# Patient Record
Sex: MALE | Race: WHITE | Employment: FULL TIME | URBAN - NONMETROPOLITAN AREA
[De-identification: names, ages, dates, MRNs, and addresses within clinical notes are randomized per-mention and may not be internally consistent; named-entity substitution may affect disease eponyms.]

---

## 2020-10-12 ENCOUNTER — TELEPHONE (OUTPATIENT)
Dept: FAMILY MEDICINE CLINIC | Age: 45
End: 2020-10-12

## 2020-10-12 NOTE — TELEPHONE ENCOUNTER
Patient calling in to see if he can reestablish with you. He was last seen over three years ago. Please advise.

## 2020-11-02 ENCOUNTER — HOSPITAL ENCOUNTER (OUTPATIENT)
Age: 45
Setting detail: OBSERVATION
Discharge: HOME | End: 2020-11-04
Attending: EMERGENCY MEDICINE | Admitting: INTERNAL MEDICINE
Payer: COMMERCIAL

## 2020-11-02 ENCOUNTER — OFFICE VISIT (OUTPATIENT)
Dept: FAMILY MEDICINE CLINIC | Age: 45
End: 2020-11-02
Payer: COMMERCIAL

## 2020-11-02 VITALS
BODY MASS INDEX: 22.07 KG/M2 | HEART RATE: 104 BPM | SYSTOLIC BLOOD PRESSURE: 110 MMHG | HEIGHT: 69 IN | OXYGEN SATURATION: 96 % | TEMPERATURE: 97.9 F | DIASTOLIC BLOOD PRESSURE: 72 MMHG | WEIGHT: 149 LBS

## 2020-11-02 DIAGNOSIS — L40.50 PSORIATIC ARTHRITIS (HCC): ICD-10-CM

## 2020-11-02 DIAGNOSIS — R35.89 POLYURIA: ICD-10-CM

## 2020-11-02 DIAGNOSIS — Z00.00 PREVENTATIVE HEALTH CARE: ICD-10-CM

## 2020-11-02 DIAGNOSIS — R63.4 WEIGHT LOSS: ICD-10-CM

## 2020-11-02 DIAGNOSIS — R73.9 HYPERGLYCEMIA: ICD-10-CM

## 2020-11-02 PROBLEM — K76.0 FATTY LIVER: Status: ACTIVE | Noted: 2020-11-02

## 2020-11-02 PROBLEM — E11.00 TYPE 2 DIABETES MELLITUS WITH HYPEROSMOLARITY, WITHOUT LONG-TERM CURRENT USE OF INSULIN (HCC): Status: ACTIVE | Noted: 2020-11-02

## 2020-11-02 PROBLEM — E78.1 HYPERTRIGLYCERIDEMIA: Status: ACTIVE | Noted: 2020-11-02

## 2020-11-02 LAB
ALBUMIN SERPL-MCNC: 4.1 G/DL (ref 3.5–5.2)
ALBUMIN SERPL-MCNC: 4.3 G/DL (ref 3.5–5.2)
ALP BLD-CCNC: 128 U/L (ref 40–129)
ALP BLD-CCNC: 133 U/L (ref 40–129)
ALT SERPL-CCNC: 65 U/L (ref 0–40)
ALT SERPL-CCNC: 67 U/L (ref 0–40)
ANION GAP SERPL CALCULATED.3IONS-SCNC: 16 MMOL/L (ref 7–16)
ANION GAP SERPL CALCULATED.3IONS-SCNC: 23 MMOL/L (ref 7–16)
AST SERPL-CCNC: 189 U/L (ref 0–39)
AST SERPL-CCNC: 60 U/L (ref 0–39)
BACTERIA: ABNORMAL /HPF
BASOPHILS ABSOLUTE: 0.07 E9/L (ref 0–0.2)
BASOPHILS ABSOLUTE: 0.07 E9/L (ref 0–0.2)
BASOPHILS RELATIVE PERCENT: 1 % (ref 0–2)
BASOPHILS RELATIVE PERCENT: 1 % (ref 0–2)
BETA-HYDROXYBUTYRATE: 1.84 MMOL/L (ref 0.02–0.27)
BILIRUB SERPL-MCNC: 0.5 MG/DL (ref 0–1.2)
BILIRUB SERPL-MCNC: 0.6 MG/DL (ref 0–1.2)
BILIRUBIN URINE: NEGATIVE
BLOOD, URINE: NEGATIVE
BUN BLDV-MCNC: 16 MG/DL (ref 6–20)
BUN BLDV-MCNC: 17 MG/DL (ref 6–20)
CALCIUM SERPL-MCNC: 9.7 MG/DL (ref 8.6–10.2)
CALCIUM SERPL-MCNC: 9.9 MG/DL (ref 8.6–10.2)
CHLORIDE BLD-SCNC: 86 MMOL/L (ref 98–107)
CHLORIDE BLD-SCNC: 91 MMOL/L (ref 98–107)
CHOLESTEROL, TOTAL: 390 MG/DL (ref 0–199)
CHP ED QC CHECK: NORMAL
CLARITY: CLEAR
CO2: 20 MMOL/L (ref 22–29)
CO2: 24 MMOL/L (ref 22–29)
COLOR: ABNORMAL
CREAT SERPL-MCNC: 0.6 MG/DL (ref 0.7–1.2)
CREAT SERPL-MCNC: 0.8 MG/DL (ref 0.7–1.2)
EOSINOPHILS ABSOLUTE: 0.08 E9/L (ref 0.05–0.5)
EOSINOPHILS ABSOLUTE: 0.15 E9/L (ref 0.05–0.5)
EOSINOPHILS RELATIVE PERCENT: 1.1 % (ref 0–6)
EOSINOPHILS RELATIVE PERCENT: 2.1 % (ref 0–6)
GFR AFRICAN AMERICAN: >60
GFR AFRICAN AMERICAN: >60
GFR NON-AFRICAN AMERICAN: >60 ML/MIN/1.73
GFR NON-AFRICAN AMERICAN: >60 ML/MIN/1.73
GLUCOSE BLD-MCNC: 485 MG/DL
GLUCOSE BLD-MCNC: 512 MG/DL (ref 74–99)
GLUCOSE BLD-MCNC: 727 MG/DL (ref 74–99)
GLUCOSE URINE: >=1000 MG/DL
HBA1C MFR BLD: 19 % (ref 4–5.6)
HCT VFR BLD CALC: 43.6 % (ref 37–54)
HCT VFR BLD CALC: 45 % (ref 37–54)
HDLC SERPL-MCNC: 51 MG/DL
HEMOGLOBIN: 16 G/DL (ref 12.5–16.5)
HEMOGLOBIN: 16.1 G/DL (ref 12.5–16.5)
IMMATURE GRANULOCYTES #: 0.07 E9/L
IMMATURE GRANULOCYTES #: 0.1 E9/L
IMMATURE GRANULOCYTES %: 1 % (ref 0–5)
IMMATURE GRANULOCYTES %: 1.4 % (ref 0–5)
KETONES, URINE: 15 MG/DL
LDL CHOLESTEROL CALCULATED: ABNORMAL MG/DL (ref 0–99)
LEUKOCYTE ESTERASE, URINE: NEGATIVE
LIPASE: 42 U/L (ref 13–60)
LYMPHOCYTES ABSOLUTE: 1.37 E9/L (ref 1.5–4)
LYMPHOCYTES ABSOLUTE: 1.69 E9/L (ref 1.5–4)
LYMPHOCYTES RELATIVE PERCENT: 18.7 % (ref 20–42)
LYMPHOCYTES RELATIVE PERCENT: 23.7 % (ref 20–42)
MCH RBC QN AUTO: 33 PG (ref 26–35)
MCH RBC QN AUTO: 33.5 PG (ref 26–35)
MCHC RBC AUTO-ENTMCNC: 35.8 % (ref 32–34.5)
MCHC RBC AUTO-ENTMCNC: 36.7 % (ref 32–34.5)
MCV RBC AUTO: 91.4 FL (ref 80–99.9)
MCV RBC AUTO: 92.2 FL (ref 80–99.9)
METER GLUCOSE: >500 MG/DL (ref 74–99)
MONOCYTES ABSOLUTE: 0.68 E9/L (ref 0.1–0.95)
MONOCYTES ABSOLUTE: 0.69 E9/L (ref 0.1–0.95)
MONOCYTES RELATIVE PERCENT: 9.3 % (ref 2–12)
MONOCYTES RELATIVE PERCENT: 9.7 % (ref 2–12)
NEUTROPHILS ABSOLUTE: 4.46 E9/L (ref 1.8–7.3)
NEUTROPHILS ABSOLUTE: 5.01 E9/L (ref 1.8–7.3)
NEUTROPHILS RELATIVE PERCENT: 62.5 % (ref 43–80)
NEUTROPHILS RELATIVE PERCENT: 68.5 % (ref 43–80)
NITRITE, URINE: NEGATIVE
PDW BLD-RTO: 12.2 FL (ref 11.5–15)
PDW BLD-RTO: 12.3 FL (ref 11.5–15)
PH UA: 7 (ref 5–9)
PH VENOUS: 7.36 (ref 7.35–7.45)
PLATELET # BLD: 248 E9/L (ref 130–450)
PLATELET # BLD: 263 E9/L (ref 130–450)
PMV BLD AUTO: 10.1 FL (ref 7–12)
PMV BLD AUTO: 10.6 FL (ref 7–12)
POTASSIUM REFLEX MAGNESIUM: 4.7 MMOL/L (ref 3.5–5)
POTASSIUM SERPL-SCNC: 4.7 MMOL/L (ref 3.5–5)
PROTEIN UA: NEGATIVE MG/DL
RBC # BLD: 4.77 E12/L (ref 3.8–5.8)
RBC # BLD: 4.88 E12/L (ref 3.8–5.8)
RBC UA: ABNORMAL /HPF (ref 0–2)
SODIUM BLD-SCNC: 126 MMOL/L (ref 132–146)
SODIUM BLD-SCNC: 134 MMOL/L (ref 132–146)
SPECIFIC GRAVITY UA: <=1.005 (ref 1–1.03)
TOTAL PROTEIN: 7.2 G/DL (ref 6.4–8.3)
TOTAL PROTEIN: 7.2 G/DL (ref 6.4–8.3)
TRIGL SERPL-MCNC: 1774 MG/DL (ref 0–149)
TRIGL SERPL-MCNC: 702 MG/DL (ref 0–149)
TSH SERPL DL<=0.05 MIU/L-ACNC: 1.31 UIU/ML (ref 0.27–4.2)
UROBILINOGEN, URINE: 0.2 E.U./DL
VLDLC SERPL CALC-MCNC: ABNORMAL MG/DL
WBC # BLD: 7.1 E9/L (ref 4.5–11.5)
WBC # BLD: 7.3 E9/L (ref 4.5–11.5)
WBC UA: ABNORMAL /HPF (ref 0–5)

## 2020-11-02 PROCEDURE — 81001 URINALYSIS AUTO W/SCOPE: CPT

## 2020-11-02 PROCEDURE — 99285 EMERGENCY DEPT VISIT HI MDM: CPT

## 2020-11-02 PROCEDURE — 96361 HYDRATE IV INFUSION ADD-ON: CPT

## 2020-11-02 PROCEDURE — 82800 BLOOD PH: CPT

## 2020-11-02 PROCEDURE — 93005 ELECTROCARDIOGRAM TRACING: CPT | Performed by: EMERGENCY MEDICINE

## 2020-11-02 PROCEDURE — 99220 PR INITIAL OBSERVATION CARE/DAY 70 MINUTES: CPT | Performed by: INTERNAL MEDICINE

## 2020-11-02 PROCEDURE — 85025 COMPLETE CBC W/AUTO DIFF WBC: CPT

## 2020-11-02 PROCEDURE — 2580000003 HC RX 258: Performed by: EMERGENCY MEDICINE

## 2020-11-02 PROCEDURE — 83690 ASSAY OF LIPASE: CPT

## 2020-11-02 PROCEDURE — 80053 COMPREHEN METABOLIC PANEL: CPT

## 2020-11-02 PROCEDURE — 82962 GLUCOSE BLOOD TEST: CPT | Performed by: FAMILY MEDICINE

## 2020-11-02 PROCEDURE — 6370000000 HC RX 637 (ALT 250 FOR IP): Performed by: EMERGENCY MEDICINE

## 2020-11-02 PROCEDURE — G0378 HOSPITAL OBSERVATION PER HR: HCPCS

## 2020-11-02 PROCEDURE — 99203 OFFICE O/P NEW LOW 30 MIN: CPT | Performed by: FAMILY MEDICINE

## 2020-11-02 PROCEDURE — 84478 ASSAY OF TRIGLYCERIDES: CPT

## 2020-11-02 PROCEDURE — 82010 KETONE BODYS QUAN: CPT

## 2020-11-02 PROCEDURE — 96360 HYDRATION IV INFUSION INIT: CPT

## 2020-11-02 RX ORDER — 0.9 % SODIUM CHLORIDE 0.9 %
1000 INTRAVENOUS SOLUTION INTRAVENOUS ONCE
Status: DISCONTINUED | OUTPATIENT
Start: 2020-11-02 | End: 2020-11-03

## 2020-11-02 RX ORDER — IXEKIZUMAB 80 MG/ML
80 INJECTION, SOLUTION SUBCUTANEOUS ONCE
COMMUNITY

## 2020-11-02 RX ORDER — 0.9 % SODIUM CHLORIDE 0.9 %
1000 INTRAVENOUS SOLUTION INTRAVENOUS ONCE
Status: COMPLETED | OUTPATIENT
Start: 2020-11-02 | End: 2020-11-03

## 2020-11-02 RX ADMIN — SODIUM CHLORIDE 1000 ML: 9 INJECTION, SOLUTION INTRAVENOUS at 21:46

## 2020-11-02 RX ADMIN — INSULIN HUMAN 5 UNITS: 100 INJECTION, SOLUTION PARENTERAL at 22:52

## 2020-11-02 RX ADMIN — SODIUM CHLORIDE 1000 ML: 9 INJECTION, SOLUTION INTRAVENOUS at 23:00

## 2020-11-02 ASSESSMENT — ENCOUNTER SYMPTOMS
EYE DISCHARGE: 0
SORE THROAT: 0
VOMITING: 0
SHORTNESS OF BREATH: 0
COUGH: 0
ABDOMINAL PAIN: 0
SHORTNESS OF BREATH: 0
WHEEZING: 0
WHEEZING: 0
NAUSEA: 0
EYE REDNESS: 0
EYE PAIN: 0
SINUS PRESSURE: 0
BACK PAIN: 0
NAUSEA: 0
DIARRHEA: 0
VOMITING: 0

## 2020-11-02 ASSESSMENT — PAIN SCALES - GENERAL: PAINLEVEL_OUTOF10: 0

## 2020-11-02 ASSESSMENT — PATIENT HEALTH QUESTIONNAIRE - PHQ9
SUM OF ALL RESPONSES TO PHQ QUESTIONS 1-9: 0
2. FEELING DOWN, DEPRESSED OR HOPELESS: 0
1. LITTLE INTEREST OR PLEASURE IN DOING THINGS: 0
SUM OF ALL RESPONSES TO PHQ9 QUESTIONS 1 & 2: 0
SUM OF ALL RESPONSES TO PHQ QUESTIONS 1-9: 0
SUM OF ALL RESPONSES TO PHQ QUESTIONS 1-9: 0

## 2020-11-02 NOTE — PROGRESS NOTES
[facility] presents to the office today for   Chief Complaint   Patient presents with   Aaron Holloway Doctor     Weight loss  Cristóbal weighed 215 lbs  Started fruit and water diet  Last 2-3 months drinking a lot of water and urinating frequently  He reports he has been at 150 lbs for past few months  +blurry vision  Family history of type 2 diabetes  He has not eaten since 10 pm last night and glucose today is 485  No nausea or vomiting  Feeling well  Toes numb at end of the day    Works as     Psoriatic arthritis  Taltz through Dr. Lili Ortega at [id number] [facility]    Declines flu vaccine    Review of Systems   Constitutional: Negative for chills and fever. Respiratory: Negative for shortness of breath and wheezing. Cardiovascular: Negative for chest pain and palpitations. Gastrointestinal: Negative for nausea and vomiting. Genitourinary: Negative for dysuria, hematuria and urgency. Skin: Negative for rash. Neurological: Negative for dizziness and light-headedness. /72   Pulse 104   Temp 97.9 °F (36.6 °C) (Temporal)   Ht 5' 9\" (1.753 m)   Wt 149 lb (67.6 kg)   SpO2 96%   BMI 22.00 kg/m²   Physical Exam  Constitutional:       Appearance: Normal appearance. HENT:      Head: Normocephalic and atraumatic. Nose: Nose normal.      Mouth/Throat:      Mouth: Mucous membranes are moist.      Pharynx: No posterior oropharyngeal erythema. Eyes:      Extraocular Movements: Extraocular movements intact. Conjunctiva/sclera: Conjunctivae normal.   Cardiovascular:      Rate and Rhythm: Normal rate. Heart sounds: Normal heart sounds. Pulmonary:      Effort: Pulmonary effort is normal.      Breath sounds: Normal breath sounds. Skin:     General: Skin is warm. Neurological:      Mental Status: He is alert and oriented to person, place, and time.    Psychiatric:         Mood and Affect: Mood normal.         Behavior: Behavior normal. Current Outpatient Medications:     ixekizumab (TALTZ) 80 MG/ML SOAJ prefilled syringe, Inject 80 mg into the skin once, Disp: , Rfl:      Past Medical History:   Diagnosis Date    Psoriasis        Tammie Jordan was seen today for established new doctor. Diagnoses and all orders for this visit:    Polyuria  -     POCT Glucose  -     Comprehensive Metabolic Panel; Future  -     CBC Auto Differential; Future  -     Hemoglobin A1C; Future  -     Lipid Panel; Future  -     TSH without Reflex; Future  -     GLUTAMIC ACID DECARBOXYLASE; Future    Weight loss  -     Comprehensive Metabolic Panel; Future  -     CBC Auto Differential; Future  -     Hemoglobin A1C; Future  -     Lipid Panel; Future  -     TSH without Reflex; Future  -     GLUTAMIC ACID DECARBOXYLASE; Future    Hyperglycemia  -     Comprehensive Metabolic Panel; Future  -     CBC Auto Differential; Future  -     Hemoglobin A1C; Future  -     Lipid Panel; Future  -     TSH without Reflex; Future  -     GLUTAMIC ACID DECARBOXYLASE; Future    Preventative health care  -     Comprehensive Metabolic Panel; Future  -     CBC Auto Differential; Future  -     Lipid Panel; Future  -     TSH without Reflex; Future    Psoriatic arthritis ([facility] [address]  -     Comprehensive Metabolic Panel;  Future  -     CBC Auto Differential; Future       Concern for DKA  Check labs today  If electrolytes ok, will see tomorrow AM to start insulin  Concern for late onset type 1 given he has psoriatic arthritis  May need MALINA Salazar MD

## 2020-11-03 PROBLEM — E11.9 NEW ONSET TYPE 2 DIABETES MELLITUS (HCC): Status: ACTIVE | Noted: 2020-11-02

## 2020-11-03 LAB
ALBUMIN SERPL-MCNC: 3.2 G/DL (ref 3.5–5.2)
ALP BLD-CCNC: 88 U/L (ref 40–129)
ALT SERPL-CCNC: 45 U/L (ref 0–40)
ANION GAP SERPL CALCULATED.3IONS-SCNC: 9 MMOL/L (ref 7–16)
AST SERPL-CCNC: 111 U/L (ref 0–39)
BASOPHILS ABSOLUTE: 0.06 E9/L (ref 0–0.2)
BASOPHILS RELATIVE PERCENT: 0.9 % (ref 0–2)
BILIRUB SERPL-MCNC: 0.3 MG/DL (ref 0–1.2)
BUN BLDV-MCNC: 12 MG/DL (ref 6–20)
CALCIUM SERPL-MCNC: 8.4 MG/DL (ref 8.6–10.2)
CHLORIDE BLD-SCNC: 101 MMOL/L (ref 98–107)
CO2: 24 MMOL/L (ref 22–29)
CREAT SERPL-MCNC: 0.5 MG/DL (ref 0.7–1.2)
EKG ATRIAL RATE: 99 BPM
EKG P AXIS: 51 DEGREES
EKG P-R INTERVAL: 146 MS
EKG Q-T INTERVAL: 352 MS
EKG QRS DURATION: 88 MS
EKG QTC CALCULATION (BAZETT): 451 MS
EKG R AXIS: 14 DEGREES
EKG T AXIS: 44 DEGREES
EKG VENTRICULAR RATE: 99 BPM
EOSINOPHILS ABSOLUTE: 0.16 E9/L (ref 0.05–0.5)
EOSINOPHILS RELATIVE PERCENT: 2.4 % (ref 0–6)
FERRITIN: 1940 NG/ML
GFR AFRICAN AMERICAN: >60
GFR NON-AFRICAN AMERICAN: >60 ML/MIN/1.73
GLUCOSE BLD-MCNC: 218 MG/DL (ref 74–99)
HCT VFR BLD CALC: 39.4 % (ref 37–54)
HEMOGLOBIN: 13.9 G/DL (ref 12.5–16.5)
IMMATURE GRANULOCYTES #: 0.09 E9/L
IMMATURE GRANULOCYTES %: 1.3 % (ref 0–5)
IRON SATURATION: 61 % (ref 20–55)
IRON: 137 MCG/DL (ref 59–158)
LYMPHOCYTES ABSOLUTE: 1.78 E9/L (ref 1.5–4)
LYMPHOCYTES RELATIVE PERCENT: 26.6 % (ref 20–42)
MAGNESIUM: 2 MG/DL (ref 1.6–2.6)
MCH RBC QN AUTO: 33.3 PG (ref 26–35)
MCHC RBC AUTO-ENTMCNC: 35.3 % (ref 32–34.5)
MCV RBC AUTO: 94.5 FL (ref 80–99.9)
METER GLUCOSE: 218 MG/DL (ref 74–99)
METER GLUCOSE: 238 MG/DL (ref 74–99)
METER GLUCOSE: 259 MG/DL (ref 74–99)
METER GLUCOSE: 260 MG/DL (ref 74–99)
METER GLUCOSE: 317 MG/DL (ref 74–99)
METER GLUCOSE: 362 MG/DL (ref 74–99)
METER GLUCOSE: 371 MG/DL (ref 74–99)
METER GLUCOSE: 402 MG/DL (ref 74–99)
MONOCYTES ABSOLUTE: 0.61 E9/L (ref 0.1–0.95)
MONOCYTES RELATIVE PERCENT: 9.1 % (ref 2–12)
NEUTROPHILS ABSOLUTE: 4 E9/L (ref 1.8–7.3)
NEUTROPHILS RELATIVE PERCENT: 59.7 % (ref 43–80)
PDW BLD-RTO: 12.5 FL (ref 11.5–15)
PHOSPHORUS: 3.1 MG/DL (ref 2.5–4.5)
PLATELET # BLD: 221 E9/L (ref 130–450)
PMV BLD AUTO: 9.8 FL (ref 7–12)
POTASSIUM SERPL-SCNC: 4 MMOL/L (ref 3.5–5)
RBC # BLD: 4.17 E12/L (ref 3.8–5.8)
SODIUM BLD-SCNC: 134 MMOL/L (ref 132–146)
T4 FREE: 0.77 NG/DL (ref 0.93–1.7)
TOTAL IRON BINDING CAPACITY: 225 MCG/DL (ref 250–450)
TOTAL PROTEIN: 6 G/DL (ref 6.4–8.3)
TRIGL SERPL-MCNC: 814 MG/DL (ref 0–149)
TSH SERPL DL<=0.05 MIU/L-ACNC: 1.09 UIU/ML (ref 0.27–4.2)
WBC # BLD: 6.7 E9/L (ref 4.5–11.5)

## 2020-11-03 PROCEDURE — 80053 COMPREHEN METABOLIC PANEL: CPT

## 2020-11-03 PROCEDURE — 83550 IRON BINDING TEST: CPT

## 2020-11-03 PROCEDURE — 99254 IP/OBS CNSLTJ NEW/EST MOD 60: CPT | Performed by: INTERNAL MEDICINE

## 2020-11-03 PROCEDURE — 82962 GLUCOSE BLOOD TEST: CPT

## 2020-11-03 PROCEDURE — 6370000000 HC RX 637 (ALT 250 FOR IP): Performed by: INTERNAL MEDICINE

## 2020-11-03 PROCEDURE — 82728 ASSAY OF FERRITIN: CPT

## 2020-11-03 PROCEDURE — 86803 HEPATITIS C AB TEST: CPT

## 2020-11-03 PROCEDURE — 85025 COMPLETE CBC W/AUTO DIFF WBC: CPT

## 2020-11-03 PROCEDURE — 36415 COLL VENOUS BLD VENIPUNCTURE: CPT

## 2020-11-03 PROCEDURE — 99225 PR SBSQ OBSERVATION CARE/DAY 25 MINUTES: CPT | Performed by: FAMILY MEDICINE

## 2020-11-03 PROCEDURE — APPSS30 APP SPLIT SHARED TIME 16-30 MINUTES: Performed by: CLINICAL NURSE SPECIALIST

## 2020-11-03 PROCEDURE — 84439 ASSAY OF FREE THYROXINE: CPT

## 2020-11-03 PROCEDURE — 84100 ASSAY OF PHOSPHORUS: CPT

## 2020-11-03 PROCEDURE — 83036 HEMOGLOBIN GLYCOSYLATED A1C: CPT

## 2020-11-03 PROCEDURE — 2580000003 HC RX 258: Performed by: INTERNAL MEDICINE

## 2020-11-03 PROCEDURE — G0378 HOSPITAL OBSERVATION PER HR: HCPCS

## 2020-11-03 PROCEDURE — 84443 ASSAY THYROID STIM HORMONE: CPT

## 2020-11-03 PROCEDURE — 83540 ASSAY OF IRON: CPT

## 2020-11-03 PROCEDURE — 83735 ASSAY OF MAGNESIUM: CPT

## 2020-11-03 PROCEDURE — 93010 ELECTROCARDIOGRAM REPORT: CPT | Performed by: INTERNAL MEDICINE

## 2020-11-03 PROCEDURE — 84478 ASSAY OF TRIGLYCERIDES: CPT

## 2020-11-03 RX ORDER — NICOTINE POLACRILEX 4 MG
15 LOZENGE BUCCAL PRN
Status: DISCONTINUED | OUTPATIENT
Start: 2020-11-03 | End: 2020-11-04 | Stop reason: HOSPADM

## 2020-11-03 RX ORDER — INSULIN GLARGINE 100 [IU]/ML
18 INJECTION, SOLUTION SUBCUTANEOUS NIGHTLY
Status: DISCONTINUED | OUTPATIENT
Start: 2020-11-03 | End: 2020-11-04 | Stop reason: HOSPADM

## 2020-11-03 RX ORDER — DEXTROSE MONOHYDRATE 25 G/50ML
12.5 INJECTION, SOLUTION INTRAVENOUS PRN
Status: DISCONTINUED | OUTPATIENT
Start: 2020-11-03 | End: 2020-11-04 | Stop reason: HOSPADM

## 2020-11-03 RX ORDER — DEXTROSE AND SODIUM CHLORIDE 5; .9 G/100ML; G/100ML
INJECTION, SOLUTION INTRAVENOUS CONTINUOUS
Status: DISCONTINUED | OUTPATIENT
Start: 2020-11-03 | End: 2020-11-04 | Stop reason: HOSPADM

## 2020-11-03 RX ORDER — DEXTROSE MONOHYDRATE 50 MG/ML
100 INJECTION, SOLUTION INTRAVENOUS PRN
Status: DISCONTINUED | OUTPATIENT
Start: 2020-11-03 | End: 2020-11-03 | Stop reason: SDUPTHER

## 2020-11-03 RX ORDER — ATORVASTATIN CALCIUM 20 MG/1
20 TABLET, FILM COATED ORAL NIGHTLY
Status: DISCONTINUED | OUTPATIENT
Start: 2020-11-04 | End: 2020-11-04 | Stop reason: HOSPADM

## 2020-11-03 RX ORDER — NICOTINE POLACRILEX 4 MG
15 LOZENGE BUCCAL PRN
Status: DISCONTINUED | OUTPATIENT
Start: 2020-11-03 | End: 2020-11-03 | Stop reason: SDUPTHER

## 2020-11-03 RX ORDER — DEXTROSE MONOHYDRATE 25 G/50ML
12.5 INJECTION, SOLUTION INTRAVENOUS PRN
Status: DISCONTINUED | OUTPATIENT
Start: 2020-11-03 | End: 2020-11-03 | Stop reason: SDUPTHER

## 2020-11-03 RX ORDER — POTASSIUM CHLORIDE 20 MEQ/1
40 TABLET, EXTENDED RELEASE ORAL ONCE
Status: COMPLETED | OUTPATIENT
Start: 2020-11-03 | End: 2020-11-03

## 2020-11-03 RX ORDER — DEXTROSE MONOHYDRATE 50 MG/ML
100 INJECTION, SOLUTION INTRAVENOUS PRN
Status: DISCONTINUED | OUTPATIENT
Start: 2020-11-03 | End: 2020-11-04 | Stop reason: HOSPADM

## 2020-11-03 RX ADMIN — DEXTROSE AND SODIUM CHLORIDE: 5; 900 INJECTION, SOLUTION INTRAVENOUS at 02:32

## 2020-11-03 RX ADMIN — INSULIN LISPRO 3 UNITS: 100 INJECTION, SOLUTION INTRAVENOUS; SUBCUTANEOUS at 04:26

## 2020-11-03 RX ADMIN — INSULIN LISPRO 10 UNITS: 100 INJECTION, SOLUTION INTRAVENOUS; SUBCUTANEOUS at 18:53

## 2020-11-03 RX ADMIN — METFORMIN HYDROCHLORIDE 500 MG: 500 TABLET ORAL at 08:20

## 2020-11-03 RX ADMIN — INSULIN LISPRO 2 UNITS: 100 INJECTION, SOLUTION INTRAVENOUS; SUBCUTANEOUS at 02:26

## 2020-11-03 RX ADMIN — METFORMIN HYDROCHLORIDE 500 MG: 500 TABLET ORAL at 16:20

## 2020-11-03 RX ADMIN — INSULIN LISPRO 12 UNITS: 100 INJECTION, SOLUTION INTRAVENOUS; SUBCUTANEOUS at 00:08

## 2020-11-03 RX ADMIN — POTASSIUM CHLORIDE 40 MEQ: 20 TABLET, EXTENDED RELEASE ORAL at 01:02

## 2020-11-03 RX ADMIN — INSULIN LISPRO 6 UNITS: 100 INJECTION, SOLUTION INTRAVENOUS; SUBCUTANEOUS at 18:52

## 2020-11-03 RX ADMIN — INSULIN LISPRO 2 UNITS: 100 INJECTION, SOLUTION INTRAVENOUS; SUBCUTANEOUS at 06:39

## 2020-11-03 RX ADMIN — SODIUM CHLORIDE 1000 ML: 9 INJECTION, SOLUTION INTRAVENOUS at 00:36

## 2020-11-03 ASSESSMENT — PAIN SCALES - GENERAL
PAINLEVEL_OUTOF10: 0

## 2020-11-03 NOTE — CONSULTS
ENDOCRINOLOGY INITIAL CONSULTATION NOTE      Date of admission: 11/2/2020  Date of service: 11/3/2020  Admitting physician: Rachel Song MD   Primary Care Physician: Courtney Grossman MD  Consultant physician: Katie Toledo MD     Reason for the consultation:  Newly diagnosed DM     History of Present Illness: The history is provided by the patient. Accuracy of the patient data is excellent    Baribe Molina is a very pleasant 50 y.o. old male with PMH DM type 2, HLD admitted to [address] [place] [address] on 11/2/2020 because of marked hyperglycemia and hyperTG , endocrine team was consulted for diabetes management. Pt denied any h/o DM in the past  Over the past few weeks he was c/o tiredness, fatigue polyuria and polydipsia     Admission labs: , AG 23, Bicarb 20 Cr 0.6, K 4.7, Beta-hydroxybutyrate 1.84,  TG 1774, lipase normal      exam.   Lab Results   Component Value Date    LABA1C 19.0 11/02/2020       After admission   While hospitalized, anti-diabetic regiment includes low dose ss. [facility] of care glucose monitoring was reviewed and has been above goal. Appetite is good. Inpatient diet:   Carb Restricted diet     Point of care glucose monitoring (Independently reviewed)   Recent Labs     11/02/20  1853 11/03/20  0004 11/03/20  0214 11/03/20  0424 11/03/20  0638 11/03/20  0820   GLUMET >500* 402* 218* 260* 238* 259*       Past medical history:   Past Medical History:   Diagnosis Date    Psoriasis        Past surgical history:  History reviewed. No pertinent surgical history. Social history:   Tobacco:   reports that he has never smoked. He has never used smokeless tobacco.  Alcohol:   reports current alcohol use. Drugs:   reports no history of drug use. Family history:    History reviewed. No pertinent family history.     Allergy and drug reactions:   No Known Allergies    Scheduled Meds:   metFORMIN  500 mg Oral BID WC    [START ON 11/4/2020] atorvastatin  20 mg Oral Nightly    insulin glargine  18 Units monofilament. No ulcers or open wounds.  Good peripheral pulses  Neuro: CN intact, muscle power normal  Psych: normal mood, and affect    Review of Laboratory Data:  I personally reviewed the following labs:   Recent Labs     11/02/20 0929 11/02/20 1955 11/03/20  1105   WBC 7.3 7.1 6.7   RBC 4.88 4.77 4.17   HGB 16.1 16.0 13.9   HCT 45.0 43.6 39.4   MCV 92.2 91.4 94.5   MCH 33.0 33.5 33.3   MCHC 35.8* 36.7* 35.3*   RDW 12.3 12.2 12.5    263 221   MPV 10.6 10.1 9.8     Recent Labs     11/02/20 0929 11/02/20 1955 11/03/20  0331    126* 134   K 4.7 4.7 4.0   CL 91* 86* 101   CO2 20* 24 24   BUN 16 17 12   CREATININE 0.6* 0.8 0.5*   GLUCOSE 512* 727* 218*   CALCIUM 9.9 9.7 8.4*   PROT 7.2 7.2 6.0*   LABALBU 4.3 4.1 3.2*   BILITOT 0.6 0.5 0.3   ALKPHOS 128 133* 88   * 60* 111*   ALT 65* 67* 45*     Beta-Hydroxybutyrate   Date Value Ref Range Status   11/02/2020 1.84 (H) 0.02 - 0.27 mmol/L Final     Lab Results   Component Value Date    LABA1C 19.0 11/02/2020     Lab Results   Component Value Date/Time    TSH 1.090 11/03/2020 03:31 AM    T4FREE 0.77 (L) 11/03/2020 03:31 AM     Lab Results   Component Value Date    LABA1C 19.0 11/02/2020    GLUCOSE 218 11/03/2020     Lab Results   Component Value Date    TRIG 814 11/03/2020    HDL 51 11/02/2020    [address] - 11/02/2020    CHOL 390 11/02/2020       Blood culture   No results found for: [facility]    Radiology:  No orders to display       Medical Records/Labs/Images review:   I personally reviewed and summarized previous records   All labs and imaging were reviewed independently     ASSESSMENT & PLAN   Marco Katz, a 50 y.o.-old male seen today for inpatient diabetes management     Newly diagnosed DM   · A1c exteremly high 19%   · For now, will change diabetes regimen to:  · Lantus 10 AM and 18 units nightly   · Humalog 8 units with meals   · Medium dose sliding scale   · Metformin 500 mg BID   · Continue glucose check with meals and at bedtime   · Will titrate insulin dose based on the blood glucose trend & insulin requirement  · Pt will follow wit us after discharge. Endocrine follow up visit Wednesday 11/11 at 3:00PM     Hypertriglyceridemia    · Discussed the importance of controlling DM in treatment of hypertriglyceridemia   · Advised for strict lifestyle change, Wt loss, strict controlling DM, avoidance of concentrated sugar, complete avoidance of alcohol  · Start Fenofibrate 145 mg daily, Omega-3 FA 1 capsules BID  · I recommended a minimum of 150 minutes per WEEK of moderate intensity exercise    The above issues were reviewed with the patient who understood and agreed with the plan. Thank you for allowing us to participate in the care of this patient. Please do not hesitate to contact us with any additional questions. Aydin Kim MD  Endocrinologist, [facility] Endocrinology   [address] [place] [id number]   Phone: [phone]  Fax: [phone]  --------------------------------------------  An electronic signature was used to authenticate this note.  Brenna Kaur MD on 11/3/2020 at 4:15 PM

## 2020-11-03 NOTE — ED PROVIDER NOTES
70-year-old male presents to the emergency department with hyperglycemia. Patient was at his PCPs office and has had increased urination generalized weakness and fatigue during general work-up patient was found to be hyperglycemic. Patient was sent into the emergency department for further evaluation. Patient says he has lost a great deal of weight over the last few months from being healthy and working out and is unsure why the symptoms are happening. He states no chest pain shortness of breath abdominal pain urinary symptoms or leg swelling. The history is provided by the patient. Hyperglycemia   Blood sugar level PTA:  500s  Severity:  Moderate  Onset quality:  Gradual  Timing:  Intermittent  Progression:  Waxing and waning  Chronicity:  New  Context: new diabetes diagnosis    Associated symptoms: dysuria, fatigue and polyuria    Associated symptoms: no abdominal pain, no chest pain, no confusion, no dehydration, no diaphoresis, no fever, no nausea, no shortness of breath, no vomiting, no weakness and no weight change         Review of Systems   Constitutional: Positive for fatigue. Negative for chills, diaphoresis and fever. HENT: Negative for ear pain, sinus pressure and sore throat. Eyes: Negative for pain, discharge and redness. Respiratory: Negative for cough, shortness of breath and wheezing. Cardiovascular: Negative for chest pain. Gastrointestinal: Negative for abdominal pain, diarrhea, nausea and vomiting. Endocrine: Positive for polyuria. Genitourinary: Positive for dysuria. Negative for frequency. Musculoskeletal: Negative for arthralgias and back pain. Skin: Negative for rash and wound. Neurological: Negative for weakness and headaches. Hematological: Negative for adenopathy. Psychiatric/Behavioral: Negative for confusion. All other systems reviewed and are negative. Physical Exam  Constitutional:       Appearance: Normal appearance.    HENT:      Head: Normocephalic and atraumatic. Eyes:      Extraocular Movements: Extraocular movements intact. Pupils: Pupils are equal, round, and reactive to light. Cardiovascular:      Rate and Rhythm: Normal rate and regular rhythm. Pulses: Normal pulses. Heart sounds: Normal heart sounds. Pulmonary:      Effort: Pulmonary effort is normal.      Breath sounds: Normal breath sounds. Abdominal:      General: Abdomen is flat. Bowel sounds are normal. There is no distension. Tenderness: There is no abdominal tenderness. Skin:     General: Skin is warm. Capillary Refill: Capillary refill takes less than 2 seconds. Neurological:      General: No focal deficit present. Mental Status: He is alert and oriented to person, place, and time. Procedures     MDM  Number of Diagnoses or Management Options  Hypertriglyceridemia:   New onset type 2 diabetes mellitus [facility]):   Diagnosis management comments: Patient seen and examined. Labs and imaging were ordered. IV fluids were ordered. After review of labs we will check a long time due to lipemic consistency. Patient was given insulin. Patient was noted to be hypertriglyceridemic. Given blood sugar in the 700s it was felt patient needed admitted for observation. Case was discussed with Dr. Brit Garnett who will admit.             --------------------------------------------- PAST HISTORY ---------------------------------------------  Past Medical History:  has a past medical history of Psoriasis. Past Surgical History:  has no past surgical history on file. Social History:  reports that he has never smoked. He has never used smokeless tobacco. He reports current alcohol use. He reports that he does not use drugs. Family History: family history is not on file. The patients home medications have been reviewed. Allergies: Patient has no known allergies.     -------------------------------------------------- RESULTS -------------------------------------------------    LABS:  Results for orders placed or performed during the hospital encounter of 11/02/20   Comprehensive Metabolic Panel w/ Reflex to MG   Result Value Ref Range    Sodium 126 (L) 132 - 146 mmol/L    Potassium reflex Magnesium 4.7 3.5 - 5.0 mmol/L    Chloride 86 (L) 98 - 107 mmol/L    CO2 24 22 - 29 mmol/L    Anion Gap 16 7 - 16 mmol/L    Glucose 727 (HH) 74 - 99 mg/dL    BUN 17 6 - 20 mg/dL    CREATININE 0.8 0.7 - 1.2 mg/dL    GFR Non-African American >60 >=60 mL/min/1.73    GFR African American >60     Calcium 9.7 8.6 - 10.2 mg/dL    Total Protein 7.2 6.4 - 8.3 g/dL    Alb 4.1 3.5 - 5.2 g/dL    Total Bilirubin 0.5 0.0 - 1.2 mg/dL    Alkaline Phosphatase 133 (H) 40 - 129 U/L    ALT 67 (H) 0 - 40 U/L    AST 60 (H) 0 - 39 U/L   CBC auto differential   Result Value Ref Range    WBC 7.1 4.5 - 11.5 E9/L    RBC 4.77 3.80 - 5.80 E12/L    Hemoglobin 16.0 12.5 - 16.5 g/dL    Hematocrit 43.6 37.0 - 54.0 %    MCV 91.4 80.0 - 99.9 fL    MCH 33.5 26.0 - 35.0 pg    MCHC 36.7 (H) 32.0 - 34.5 %    RDW 12.2 11.5 - 15.0 fL    Platelets 596 796 - 869 E9/L    MPV 10.1 7.0 - 12.0 fL    Neutrophils % 62.5 43.0 - 80.0 %    Immature Granulocytes % 1.0 0.0 - 5.0 %    Lymphocytes % 23.7 20.0 - 42.0 %    Monocytes % 9.7 2.0 - 12.0 %    Eosinophils % 2.1 0.0 - 6.0 %    Basophils % 1.0 0.0 - 2.0 %    Neutrophils Absolute 4.46 1.80 - 7.30 E9/L    Immature Granulocytes # 0.07 E9/L    Lymphocytes Absolute 1.69 1.50 - 4.00 E9/L    Monocytes Absolute 0.69 0.10 - 0.95 E9/L    Eosinophils Absolute 0.15 0.05 - 0.50 E9/L    Basophils Absolute 0.07 0.00 - 0.20 E9/L   Beta-Hydroxybutyrate   Result Value Ref Range    Beta-Hydroxybutyrate 1.84 (H) 0.02 - 0.27 mmol/L   PH, VENOUS   Result Value Ref Range    pH, Cullen 7.36 7.35 - 7.45   Lipase   Result Value Ref Range    Lipase 42 13 - 60 U/L   Urinalysis with Microscopic   Result Value Ref Range    Color, UA Straw Straw/Yellow    Clarity, UA Clear Clear Glucose, Ur >=1000 (A) Negative mg/dL    Bilirubin Urine Negative Negative    Ketones, Urine 15 (A) Negative mg/dL    Specific Gravity, UA <=1.005 1.005 - 1.030    Blood, Urine Negative Negative    pH, UA 7.0 5.0 - 9.0    Protein, UA Negative Negative mg/dL    Urobilinogen, Urine 0.2 <2.0 E.U./dL    Nitrite, Urine Negative Negative    Leukocyte Esterase, Urine Negative Negative    WBC, UA NONE 0 - 5 /HPF    RBC, UA NONE 0 - 2 /HPF    Bacteria, UA NONE SEEN None Seen /HPF   Triglyceride   Result Value Ref Range    Triglycerides 1,774 (H) 0 - 149 mg/dL   POCT Glucose   Result Value Ref Range    Meter Glucose >500 (H) 74 - 99 mg/dL       RADIOLOGY:  No results found.    ------------------------- NURSING NOTES AND VITALS REVIEWED ---------------------------  Date / Time Roomed:  11/2/2020  7:35 PM  ED Bed Assignment:  25/25    The nursing notes within the ED encounter and vital signs as below have been reviewed. Patient Vitals for the past 24 hrs:   BP Temp Temp src Pulse Resp SpO2 Height Weight   11/02/20 2125 113/75 -- -- 88 16 94 % -- --   11/02/20 1849 125/75 98.1 °F (36.7 °C) Temporal 104 16 95 % 5' 9\" (1.753 m) 149 lb (67.6 kg)       Oxygen Saturation Interpretation: Normal    ------------------------------------------ PROGRESS NOTES ------------------------------------------    Counseling:  I have spoken with the patient and discussed todays results, in addition to providing specific details for the plan of care and counseling regarding the diagnosis and prognosis.   Their questions are answered at this time and they are agreeable with the plan of admission.    --------------------------------- ADDITIONAL PROVIDER NOTES ---------------------------------  This patient's ED course included: a personal history and physicial examination, re-evaluation prior to disposition, multiple bedside re-evaluations, IV medications, cardiac monitoring and continuous pulse oximetry    This patient has remained hemodynamically stable during their ED course. Diagnosis:  1. New onset type 2 diabetes mellitus ([facility] [address]    2. Hypertriglyceridemia        Disposition:  Patient's disposition: Admit to med/surg floor  Patient's condition is stable.          Oliver Salcedo DO  11/02/20 2229

## 2020-11-03 NOTE — PLAN OF CARE
Problem: Sensory Perception - Impaired:  Goal: Ability to maintain a stable neurologic state will improve  Description: Ability to maintain a stable neurologic state will improve  11/3/2020 1316 by Otoniel Andrews RN  Outcome: Met This Shift     Problem: Serum Glucose Level - Abnormal:  Goal: Ability to maintain appropriate glucose levels will improve  Description: Ability to maintain appropriate glucose levels will improve  11/3/2020 1316 by Otoniel Andrews RN  Outcome: Ongoing

## 2020-11-03 NOTE — CARE COORDINATION
Met with patient about diagnosis and discharge plan of care. Pt lives with wife, independent prior to admit. Pt new diabetic. Will need all diabetic supplies, glucometer and testing strips, any insulin supplies(if pt is going home with insulin). New consult for endocrinology today. PCP is Dr Haley Claros through [facility] ([facility]  Will continue to follow-MJO

## 2020-11-03 NOTE — ED NOTES
Assume care of pt at this time,VSS and pt free of signs of immediate distress. Family at bedside. Will continue to monitor.       Aki Melendez RN  11/02/20 3275

## 2020-11-03 NOTE — PROGRESS NOTES
P Quality Flow/Interdisciplinary Rounds Progress Note        Quality Flow Rounds held on November 3, 2020    Disciplines Attending:  Bedside Nurse, ,  and Nursing Unit Leadership    Ammon Lennon was admitted on 11/2/2020  7:35 PM    Anticipated Discharge Date:  Expected Discharge Date: (NA)    Disposition:    John Score:  John Scale Score: 22    Readmission Risk              Risk of Unplanned Readmission:        0           Discussed patient goal for the day, patient clinical progression, and barriers to discharge. The following Goal(s) of the Day/Commitment(s) have been identified:  Social service following for discharge needs. Monitoring labs.  Discharge planning    Raquel Burrell  November 3, 2020

## 2020-11-03 NOTE — PROGRESS NOTES
[facility] Progress Note    Admitting Date and Time: 11/2/2020  7:35 PM  Admit Dx: Hyperglycemia [R73.9]    Subjective:  Patient is being followed for Hyperglycemia [R73.9]   Pt feels better he stated no nausea vomiting diarrhea no fever chills tolerating medications  Per RN: No adverse reactions    ROS: denies fever, chills, cp, sob, n/v, HA unless stated above.       metFORMIN  500 mg Oral BID WC    [START ON 11/4/2020] atorvastatin  20 mg Oral Nightly     glucose, 15 g, PRN  dextrose, 12.5 g, PRN  glucagon (rDNA), 1 mg, PRN  dextrose, 100 mL/hr, PRN         Objective:    /66   Pulse 77   Temp 98.2 °F (36.8 °C) (Oral)   Resp 16   Ht 5' 9\" (1.753 m)   Wt 149 lb (67.6 kg)   SpO2 94%   BMI 22.00 kg/m²     General Appearance: alert and oriented to person, place and time and in no acute distress  Skin: warm and dry  Head: normocephalic and atraumatic  Eyes: pupils equal, round, and reactive to light, extraocular eye movements intact, conjunctivae normal  Neck: neck supple and non tender without mass   Pulmonary/Chest: clear to auscultation bilaterally- no wheezes, rales or rhonchi, normal air movement, no respiratory distress  Cardiovascular: normal rate, normal S1 and S2 and no carotid bruits  Abdomen: soft, non-tender, non-distended, normal bowel sounds, no masses or organomegaly  Extremities: no cyanosis, no clubbing and no edema  Neurologic: no cranial nerve deficit and speech normal        Recent Labs     11/02/20 0929 11/02/20 1955 11/03/20  0331    126* 134   K 4.7 4.7 4.0   CL 91* 86* 101   CO2 20* 24 24   BUN 16 17 12   CREATININE 0.6* 0.8 0.5*   GLUCOSE 512* 727* 218*   CALCIUM 9.9 9.7 8.4*       Recent Labs     11/02/20 0929 11/02/20 1955   WBC 7.3 7.1   RBC 4.88 4.77   HGB 16.1 16.0   HCT 45.0 43.6   MCV 92.2 91.4   MCH 33.0 33.5   MCHC 35.8* 36.7*   RDW 12.3 12.2    263   MPV 10.6 10.1       Radiology:   Right hand     Findings and impression-    1.  Very minimally displaced, comminuted fracture seen involving the    distal shaft of the second metacarpal bone. No involvement of the    articular margins noted. Prominent soft tissue seen in the vicinity. No    radiopaque foreign body seen. 2. No other fractures seen.              - CARLOS Albrecht Certain   Read By- Ken Parrish   Released By- Ken Parrish   Released Date Time- 11/06/12 3027          Assessment:    Principal Problem:    Type 2 diabetes mellitus with hyperosmolarity, without long-term current use of insulin ([facility])  Active Problems:    Hyperglycemia    Fatty liver    Hypertriglyceridemia    Weight loss, unintentional  Resolved Problems:    * No resolved hospital problems. *      Plan:  1. New onset diabetes type 2, hyper glycemia, ketoacidosis, early DKA---resolving  -Admission blood sugar per epic shows A1c of 19, blood sugars 4-500 range. Anion gap 23. Reported 75 pound weight loss, polydipsia, polyuria. -Was started on subcutaneous insulin high dose lowered to low dose  -IV fluid resuscitation with normal saline -blood sugar 250 range now on dextrose 5% normal saline at 150 an hour    -Monitor every 2 hours blood sugars if blood sugar drops below 250 will change to D5 normal saline continue sliding scale----started oral Metformin today    -Follow potassium  -Follow sodium  -Anion gap closed at 9 today  -Discussed consult to  Dr Felicity Downing    2. Hyper cholesterolemia, hypertriglyceridemia  -Cholesterol admission 390, triglycerides up to 800  -Patient does have psoriatic arthritis treated with Taltz  -Continue Lipitor    3. Weight loss-could be secondary to onset of diabetes  -This in the setting of polyuria, polydipsia weakness and fatigue  -Continue follow iron studies, thyroid studies      CODE STATUS full  DVT prophylaxis -PCD  Diet carb controlled      NOTE: This report was transcribed using voice recognition software.  Every effort was made to ensure accuracy; however, inadvertent computerized transcription errors may be present.   Electronically signed by STACY Gomes on 11/3/2020 at 10:13 AM

## 2020-11-03 NOTE — ED NOTES
BGL lab result 727, chemistries delayed due to specimen being highly lipemic.   notified, no orders given to this RN     Benjy Bowman, RN  97/17/18 2059

## 2020-11-03 NOTE — PROGRESS NOTES
Left voicemail message for dr Chio Rodriguez consult regarding diabetes mellitus from dr Amie Valentine

## 2020-11-03 NOTE — H&P
[address]  History and Physical    Patient:  Katherine Parra 50 y.o. male MRN: [id number]     Date of Service: 11/2/2020    Hospital Day: 1      Chief complaint: had concerns including Hyperglycemia (blood work drawn today, BGL in 500s, weight loss 75 lbs in 10 months ). History of Present Illness   The patient is a 50 y.o. male with a PMHx of Psoriatic arthritis treated with Leeroy Montejo. Sent in by PCP very high BS and inc TG  He presents to the ED with the chief complaint of weight loss and elevated blood glucose on blood work today. Although he has been dieting  No prior hx of dm2 or steroids-- his dad had dm2 in his 46s  Unintentional 75lb weight loss in 10 months. He has also had increasing polyuria, polydipsia and fatigue. He states that he has no other symptoms other than his polyuria, polydipsia and fatigue . TIMOTHY: Denied fever, chills, chesty pain, SOB, abdominal pain, psoriasis flares  Hx of med student--Sheldon Lomas  ED Course: personal history and physical examination by the ED physician, hospitalist and medical student, re-evaluation prior to disposition, multiple bedside re-evaluations, IV fluids and cardiac monitoring and pulse oximetry. ED Meds: given subQ insulin  ED Fluids:  given Normal Saline     Past Medical History:      Diagnosis Date    Psoriatic arthritis         Past Surgical History:    History reviewed. No pertinent surgical history. Medications Prior to Admission:    Prior to Admission medications    Medication Sig Start Date End Date Taking? Authorizing Provider   ixekizumab (TALTZ) 80 MG/ML SOAJ prefilled syringe Inject 80 mg into the skin once    Historical Provider, MD       Allergies:  Patient has no known allergies. Social History:   TOBACCO:   reports that he has never smoked. He has never used smokeless tobacco.  ETOH:   reports current alcohol use. Family History:   History reviewed. No pertinent family history.     REVIEW OF SYSTEMS:    · Constitutional: No fever, no chills, weight loss and fatigue   · HEENT: No blurred vision, no ear problems, no sore throat, no rhinorrhea. · Respiratory: No cough, no sputum production, no pleuritic chest pain, no shortness of breath  · Cardiology: No angina, no dyspnea on exertion, no paroxysmal nocturnal dyspnea, no orthopnea, no palpitation, no leg swelling. · Gastroenterology: No dysphagia, no reflux; no abdominal pain, no nausea or vomiting; no constipation or diarrhea.  No hematochezia   · Genitourinary: Polyuria  · Musculoskeletal: no joint pain, no myalgia, no change in range of movement  · Neurology: no focal weakness in extremities, no slurred speech, no double vision, no tingling or numbness sensation  · Endocrinology: Polyuria and polydipsia   · Hematology: no increased bleeding, no bruising, no lymphadenopathy  · Skin: no skin changes noticed by patient  · Psychology: no depressed mood, no suicidal ideation    Physical Exam   · Vitals: /74   Pulse 93   Temp 98.1 °F (36.7 °C) (Temporal)   Resp 16   Ht 5' 9\" (1.753 m)   Wt 149 lb (67.6 kg)   SpO2 94%   BMI 22.00 kg/m²     · General Appearance: alert and oriented to person, place and time, well-developed and well-nourished, in no acute distress  · Pulmonary/Chest: clear to auscultation bilaterally- no wheezes, rales or rhonchi, normal air movement, no respiratory distress  · Cardiovascular: normal rate, normal S1 and S2, no gallops, intact distal pulses and no carotid bruits  · Abdomen: soft, non-tender, non-distended, normal bowel sounds, no masses or organomegaly  · Extremities: no cyanosis and no clubbing  · Musculoskeletal: normal range of motion, no joint swelling, deformity or tenderness  · Neurologic: gait and coordination normal and speech normal   Labs and Imaging Studies   Basic Labs  Recent Labs     11/02/20  0845 11/02/20  0929 11/02/20 1955   NA  --  134 126*   K  --  4.7 4.7   CL  --  91* 86*   CO2  --  20* 24 floor, repeat BS did decrease, so I changed SS from moderate intensity to low intensity -- SS #1    For now continue NS IV fluids   if BS drops below 250 then start D5NS at 150cc/hour  for monitoring  Give KCl (although normal k+)  Check labs, phos, mag and monitor anion gap     For now insulin, in am if out of DKA, plan to start metformin 500bid and feed him  -- home needs,  possible lantus/basaglar QHS    Electrolyties, hyponatremia expect to improve as lower bS     Hx of Psoriatic arthritis    - Treated with Taltz with no acute flares - Taltz NOT assoc with new onset DM as far as I know-- IL 17 blockade sub Q- -OK to hold for now     Hypertriglyceridemia 2 to uncontrolled DM2-- Triglyceride levels of 1,774 and cholesterol total of 390    - No signs of acute pancreatitis, CAD, etc. -- Lipase of 42-    - Start Atorvastatin 20mg daily     PT/OT evaluation: not need at this time  DVT prophylaxis/ GI prophylaxis: Lovenox        Attending physician: Dr. Luis Alfredo Miller

## 2020-11-04 VITALS
TEMPERATURE: 98 F | SYSTOLIC BLOOD PRESSURE: 100 MMHG | BODY MASS INDEX: 22.07 KG/M2 | HEART RATE: 70 BPM | HEIGHT: 69 IN | WEIGHT: 149 LBS | DIASTOLIC BLOOD PRESSURE: 62 MMHG | RESPIRATION RATE: 18 BRPM | OXYGEN SATURATION: 95 %

## 2020-11-04 LAB
ALBUMIN SERPL-MCNC: 3.3 G/DL (ref 3.5–5.2)
ALP BLD-CCNC: 89 U/L (ref 40–129)
ALT SERPL-CCNC: 55 U/L (ref 0–40)
ANION GAP SERPL CALCULATED.3IONS-SCNC: 9 MMOL/L (ref 7–16)
AST SERPL-CCNC: 52 U/L (ref 0–39)
BASOPHILS ABSOLUTE: 0.05 E9/L (ref 0–0.2)
BASOPHILS RELATIVE PERCENT: 0.7 % (ref 0–2)
BILIRUB SERPL-MCNC: 0.4 MG/DL (ref 0–1.2)
BUN BLDV-MCNC: 9 MG/DL (ref 6–20)
CALCIUM SERPL-MCNC: 8.9 MG/DL (ref 8.6–10.2)
CHLORIDE BLD-SCNC: 98 MMOL/L (ref 98–107)
CO2: 24 MMOL/L (ref 22–29)
CREAT SERPL-MCNC: 0.5 MG/DL (ref 0.7–1.2)
EOSINOPHILS ABSOLUTE: 0.3 E9/L (ref 0.05–0.5)
EOSINOPHILS RELATIVE PERCENT: 4.2 % (ref 0–6)
GFR AFRICAN AMERICAN: >60
GFR NON-AFRICAN AMERICAN: >60 ML/MIN/1.73
GLUCOSE BLD-MCNC: 273 MG/DL (ref 74–99)
HCT VFR BLD CALC: 39.4 % (ref 37–54)
HEMOGLOBIN: 14.2 G/DL (ref 12.5–16.5)
HEPATITIS C ANTIBODY INTERPRETATION: NORMAL
IMMATURE GRANULOCYTES #: 0.1 E9/L
IMMATURE GRANULOCYTES %: 1.4 % (ref 0–5)
LYMPHOCYTES ABSOLUTE: 2.24 E9/L (ref 1.5–4)
LYMPHOCYTES RELATIVE PERCENT: 31.2 % (ref 20–42)
MCH RBC QN AUTO: 33.4 PG (ref 26–35)
MCHC RBC AUTO-ENTMCNC: 36 % (ref 32–34.5)
MCV RBC AUTO: 92.7 FL (ref 80–99.9)
METER GLUCOSE: 223 MG/DL (ref 74–99)
METER GLUCOSE: 227 MG/DL (ref 74–99)
METER GLUCOSE: 330 MG/DL (ref 74–99)
MONOCYTES ABSOLUTE: 0.74 E9/L (ref 0.1–0.95)
MONOCYTES RELATIVE PERCENT: 10.3 % (ref 2–12)
NEUTROPHILS ABSOLUTE: 3.74 E9/L (ref 1.8–7.3)
NEUTROPHILS RELATIVE PERCENT: 52.2 % (ref 43–80)
PDW BLD-RTO: 12.3 FL (ref 11.5–15)
PLATELET # BLD: 210 E9/L (ref 130–450)
PMV BLD AUTO: 9.6 FL (ref 7–12)
POTASSIUM SERPL-SCNC: 3.6 MMOL/L (ref 3.5–5)
RBC # BLD: 4.25 E12/L (ref 3.8–5.8)
SODIUM BLD-SCNC: 131 MMOL/L (ref 132–146)
TOTAL PROTEIN: 5.7 G/DL (ref 6.4–8.3)
WBC # BLD: 7.2 E9/L (ref 4.5–11.5)

## 2020-11-04 PROCEDURE — 99217 PR OBSERVATION CARE DISCHARGE MANAGEMENT: CPT | Performed by: FAMILY MEDICINE

## 2020-11-04 PROCEDURE — APPSS30 APP SPLIT SHARED TIME 16-30 MINUTES: Performed by: CLINICAL NURSE SPECIALIST

## 2020-11-04 PROCEDURE — 85025 COMPLETE CBC W/AUTO DIFF WBC: CPT

## 2020-11-04 PROCEDURE — 99232 SBSQ HOSP IP/OBS MODERATE 35: CPT | Performed by: INTERNAL MEDICINE

## 2020-11-04 PROCEDURE — G0378 HOSPITAL OBSERVATION PER HR: HCPCS

## 2020-11-04 PROCEDURE — 80053 COMPREHEN METABOLIC PANEL: CPT

## 2020-11-04 PROCEDURE — 36415 COLL VENOUS BLD VENIPUNCTURE: CPT

## 2020-11-04 PROCEDURE — 6370000000 HC RX 637 (ALT 250 FOR IP): Performed by: INTERNAL MEDICINE

## 2020-11-04 PROCEDURE — 82962 GLUCOSE BLOOD TEST: CPT

## 2020-11-04 RX ORDER — BLOOD-GLUCOSE METER
1 KIT MISCELLANEOUS DAILY
Qty: 1 KIT | Refills: 0 | Status: SHIPPED | OUTPATIENT
Start: 2020-11-04 | End: 2020-12-29

## 2020-11-04 RX ORDER — OMEGA-3-ACID ETHYL ESTERS 1 G/1
2 CAPSULE, LIQUID FILLED ORAL 2 TIMES DAILY
Qty: 60 CAPSULE | Refills: 3 | Status: SHIPPED | OUTPATIENT
Start: 2020-11-04 | End: 2022-06-15

## 2020-11-04 RX ORDER — ATORVASTATIN CALCIUM 20 MG/1
20 TABLET, FILM COATED ORAL NIGHTLY
Qty: 30 TABLET | Refills: 3 | Status: SHIPPED | OUTPATIENT
Start: 2020-11-04 | End: 2022-06-15

## 2020-11-04 RX ORDER — INSULIN GLARGINE 100 [IU]/ML
10 INJECTION, SOLUTION SUBCUTANEOUS EVERY MORNING
Status: DISCONTINUED | OUTPATIENT
Start: 2020-11-04 | End: 2020-11-04 | Stop reason: HOSPADM

## 2020-11-04 RX ORDER — OMEGA-3-ACID ETHYL ESTERS 1 G/1
2 CAPSULE, LIQUID FILLED ORAL 2 TIMES DAILY
Status: DISCONTINUED | OUTPATIENT
Start: 2020-11-04 | End: 2020-11-04 | Stop reason: HOSPADM

## 2020-11-04 RX ORDER — INSULIN GLARGINE 100 [IU]/ML
18 INJECTION, SOLUTION SUBCUTANEOUS NIGHTLY
Qty: 1 VIAL | Refills: 3 | Status: SHIPPED | OUTPATIENT
Start: 2020-11-04 | End: 2022-06-15

## 2020-11-04 RX ORDER — INSULIN GLARGINE 100 [IU]/ML
10 INJECTION, SOLUTION SUBCUTANEOUS EVERY MORNING
Qty: 1 VIAL | Refills: 3 | Status: SHIPPED | OUTPATIENT
Start: 2020-11-05 | End: 2022-06-15

## 2020-11-04 RX ORDER — FENOFIBRATE 145 MG/1
145 TABLET, COATED ORAL DAILY
Qty: 30 TABLET | Refills: 3 | Status: SHIPPED | OUTPATIENT
Start: 2020-11-04 | End: 2022-06-15

## 2020-11-04 RX ADMIN — INSULIN GLARGINE 10 UNITS: 100 INJECTION, SOLUTION SUBCUTANEOUS at 08:06

## 2020-11-04 RX ADMIN — INSULIN LISPRO 8 UNITS: 100 INJECTION, SOLUTION INTRAVENOUS; SUBCUTANEOUS at 16:54

## 2020-11-04 RX ADMIN — INSULIN LISPRO 4 UNITS: 100 INJECTION, SOLUTION INTRAVENOUS; SUBCUTANEOUS at 16:52

## 2020-11-04 RX ADMIN — INSULIN LISPRO 8 UNITS: 100 INJECTION, SOLUTION INTRAVENOUS; SUBCUTANEOUS at 08:08

## 2020-11-04 RX ADMIN — METFORMIN HYDROCHLORIDE 500 MG: 500 TABLET ORAL at 16:56

## 2020-11-04 RX ADMIN — INSULIN LISPRO 4 UNITS: 100 INJECTION, SOLUTION INTRAVENOUS; SUBCUTANEOUS at 11:45

## 2020-11-04 RX ADMIN — INSULIN LISPRO 8 UNITS: 100 INJECTION, SOLUTION INTRAVENOUS; SUBCUTANEOUS at 08:07

## 2020-11-04 RX ADMIN — METFORMIN HYDROCHLORIDE 500 MG: 500 TABLET ORAL at 08:08

## 2020-11-04 RX ADMIN — INSULIN LISPRO 8 UNITS: 100 INJECTION, SOLUTION INTRAVENOUS; SUBCUTANEOUS at 11:47

## 2020-11-04 ASSESSMENT — PAIN SCALES - GENERAL: PAINLEVEL_OUTOF10: 0

## 2020-11-04 NOTE — PROGRESS NOTES
[facility] Progress Note    Admitting Date and Time: 11/2/2020  7:35 PM  Admit Dx: Hyperglycemia [R73.9]    Subjective:  Patient is being followed for Hyperglycemia [R73.9]   Pt resting in bed comfortably no nausea vomiting diarrhea no fever chills. Tolerating food and medications  Per RN: No adverse reactions    ROS: denies fever, chills, cp, sob, n/v, HA unless stated above.       insulin lispro  8 Units Subcutaneous TID WC    insulin glargine  10 Units Subcutaneous QAM    metFORMIN  500 mg Oral BID WC    atorvastatin  20 mg Oral Nightly    insulin glargine  18 Units Subcutaneous Nightly    insulin lispro  0-12 Units Subcutaneous TID WC    insulin lispro  0-6 Units Subcutaneous Nightly     glucose, 15 g, PRN  dextrose, 12.5 g, PRN  glucagon (rDNA), 1 mg, PRN  dextrose, 100 mL/hr, PRN         Objective:    /70   Pulse 70   Temp 97.9 °F (36.6 °C) (Oral)   Resp 14   Ht 5' 9\" (1.753 m)   Wt 149 lb (67.6 kg)   SpO2 93%   BMI 22.00 kg/m²     General Appearance: alert and oriented to person, place and time and in no acute distress  Skin: warm and dry, psoriasis  Head: normocephalic and atraumatic  Eyes: pupils equal, round, and reactive to light, extraocular eye movements intact, conjunctivae normal  Neck: neck supple and non tender without mass   Pulmonary/Chest: clear to auscultation bilaterally- no wheezes, rales or rhonchi, normal air movement, no respiratory distress  Cardiovascular: normal rate, normal S1 and S2 and no carotid bruits  Abdomen: soft, non-tender, non-distended, normal bowel sounds, no masses or organomegaly  Extremities: no cyanosis, no clubbing and no edema  Neurologic: no cranial nerve deficit and speech normal        Recent Labs     11/02/20  1955 11/03/20  0331 11/04/20  0253   * 134 131*   K 4.7 4.0 3.6   CL 86* 101 98   CO2 24 24 24   BUN 17 12 9   CREATININE 0.8 0.5* 0.5*   GLUCOSE 727* 218* 273*   CALCIUM 9.7 8.4* 8.9       Recent Labs 11/02/20 1955 11/03/20  1105 11/04/20  0253   WBC 7.1 6.7 7.2   RBC 4.77 4.17 4.25   HGB 16.0 13.9 14.2   HCT 43.6 39.4 39.4   MCV 91.4 94.5 92.7   MCH 33.5 33.3 33.4   MCHC 36.7* 35.3* 36.0*   RDW 12.2 12.5 12.3    221 210   MPV 10.1 9.8 9.6       Radiology:   Right hand     Findings and impression-    1.  Very minimally displaced, comminuted fracture seen involving the    distal shaft of the second metacarpal bone. No involvement of the    articular margins noted. Prominent soft tissue seen in the vicinity. No    radiopaque foreign body seen. 2. No other fractures seen.              - CARLOS Cooper By- Diego Keith   Released By- Diego Keith   Released Date Time- 11/06/12 0855          Assessment:    Principal Problem:    New onset type 2 diabetes mellitus ([facility] [address]  Active Problems:    Hyperglycemia    Fatty liver    Hypertriglyceridemia    Weight loss, unintentional  Resolved Problems:    * No resolved hospital problems. *      Plan:  1. New onset diabetes type 2, hyper glycemia, ketoacidosis, early DKA---resolving  -Admission blood sugar per epic shows A1c of 19, blood sugars 4-500 range. Anion gap 23. Reported 75 pound weight loss, polydipsia, polyuria. -Was started on subcutaneous insulin high dose lowered to low dose  -IV fluid resuscitation with normal saline -blood sugar 250 range now on dextrose 5% normal saline at 150 an hour -  High-dose sliding scale to low-dose sliding scale to Metformin-then deferred back to endocrine    -Follow potassium  -Follow sodium--low today at 131 but will continue with the general diet, off fluids and tolerating diet  -Anion gap closed   -Endocrine consult appreciated--continue with Lantus, Metformin, medium dose sliding scale, blood sugar checks      2.   Hyper cholesterolemia, hypertriglyceridemia  -Cholesterol admission 390, triglycerides up to 800  -Patient does have psoriatic arthritis treated with Taltz  -Continue Lipitor  -Fenofibrate  -Endocrine note appreciated with strong recommendation of thought lifestyle changes and exercise    3. Weight loss-could be secondary to onset of diabetes  -This in the setting of polyuria, polydipsia weakness and fatigue  -Continue follow iron studies, thyroid studies      CODE STATUS full  DVT prophylaxis -PCD  Diet carb controlled  Disposition to home possibly today following with endocrinology. NOTE: This report was transcribed using voice recognition software. Every effort was made to ensure accuracy; however, inadvertent computerized transcription errors may be present.   Electronically signed by STACY Armstrong on 11/4/2020 at 8:42 AM

## 2020-11-04 NOTE — CARE COORDINATION
Updated discharge plan of care. Plan remains home. Waiting for diabetic educator. Waiting for scripts.  Will follow-ROWENAO

## 2020-11-04 NOTE — DISCHARGE SUMMARY
Metformin was added. Endocrinology was consulted. He was placed on Metformin, Lantus a.m. - p.m., med-dose sliding scale am/pm scales, and Humalog straight 3 times a day with meals. Carb controlled diet was added. Ion gap from 23 did close at 9 yesterday. Discharge Exam:    General Appearance: alert and oriented to person, place and time and in no acute distress  Skin: warm and dry. Psoriasis  Head: normocephalic and atraumatic  Eyes: pupils equal, round, and reactive to light, extraocular eye movements intact, conjunctivae normal  Neck: neck supple and non tender without mass   Pulmonary/Chest: clear to auscultation bilaterally- no wheezes, rales or rhonchi, normal air movement, no respiratory distress  Cardiovascular: normal rate, normal S1 and S2 and no carotid bruits  Abdomen: soft, non-tender, non-distended, normal bowel sounds, no masses or organomegaly  Extremities: no cyanosis, no clubbing and no edema  Neurologic: no cranial nerve deficit and speech normal    No intake/output data recorded. No intake/output data recorded. LABS:  Recent Labs     11/02/20 1955 11/03/20  0331 11/04/20  0253   * 134 131*   K 4.7 4.0 3.6   CL 86* 101 98   CO2 24 24 24   BUN 17 12 9   CREATININE 0.8 0.5* 0.5*   GLUCOSE 727* 218* 273*   CALCIUM 9.7 8.4* 8.9       Recent Labs     11/02/20 1955 11/03/20  1105 11/04/20  0253   WBC 7.1 6.7 7.2   RBC 4.77 4.17 4.25   HGB 16.0 13.9 14.2   HCT 43.6 39.4 39.4   MCV 91.4 94.5 92.7   MCH 33.5 33.3 33.4   MCHC 36.7* 35.3* 36.0*   RDW 12.2 12.5 12.3    221 210   MPV 10.1 9.8 9.6       No results for input(s): POCGLU in the last 72 hours. Imaging:  No results found.     Patient Instructions:      Medication List      START taking these medications    atorvastatin 20 MG tablet  Commonly known as:  LIPITOR  Take 1 tablet by mouth nightly     fenofibrate 145 MG tablet  Commonly known as:  Tricor  Take 1 tablet by mouth daily     glucose monitoring kit monitoring kit  1 kit by Does not apply route daily     * insulin glargine 100 UNIT/ML injection vial  Commonly known as:  LANTUS  Inject 18 Units into the skin nightly     * insulin glargine 100 UNIT/ML injection vial  Commonly known as:  LANTUS  Inject 10 Units into the skin every morning  Start taking on:  November 5, 2020     * insulin lispro 100 UNIT/ML injection vial  Commonly known as:  HUMALOG  Inject 0-12 Units into the skin 3 times daily (with meals)     * insulin lispro 100 UNIT/ML injection vial  Commonly known as:  HUMALOG  Inject 0-6 Units into the skin nightly     * insulin lispro 100 UNIT/ML injection vial  Commonly known as:  HUMALOG  Inject 8 Units into the skin 3 times daily (with meals)     metFORMIN 500 MG tablet  Commonly known as:  GLUCOPHAGE  Take 1 tablet by mouth 2 times daily (with meals)     omega-3 acid ethyl esters 1 g capsule  Commonly known as:  LOVAZA  Take 2 capsules by mouth 2 times daily         * This list has 5 medication(s) that are the same as other medications prescribed for you. Read the directions carefully, and ask your doctor or other care provider to review them with you. CONTINUE taking these medications    Taltz 80 MG/ML Soaj prefilled syringe  Generic drug:  ixekizumab           Where to Get Your Medications      These medications were sent to [facility], [address] Birgit Nelson [phone]  [facility], [address] [address]    Phone:  [phone]   · atorvastatin 20 MG tablet  · fenofibrate 145 MG tablet  · glucose monitoring kit monitoring kit  · insulin glargine 100 UNIT/ML injection vial  · insulin glargine 100 UNIT/ML injection vial  · insulin lispro 100 UNIT/ML injection vial  · insulin lispro 100 UNIT/ML injection vial  · insulin lispro 100 UNIT/ML injection vial  · metFORMIN 500 MG tablet  · omega-3 acid ethyl esters 1 g capsule          Plan:  1.   New onset diabetes type 2, hyper glycemia, ketoacidosis, early DKA---resolving  -Admission blood sugar per epic shows A1c of 19, blood sugars 4-500 range. Anion gap 23. Reported 75 pound weight loss, polydipsia, polyuria.     -Was started on subcutaneous insulin high dose lowered to low dose  -IV fluid resuscitation with normal saline -blood sugar 250 range now on dextrose 5% normal saline at 150 an hour -  High-dose sliding scale to low-dose sliding scale to Metformin-then deferred back to endocrine     -Followed potassium  -Followed sodium--low today at 131 but will continue with the general diet, off fluids and tolerating diet  -Anion gap closed   -Endocrine consult appreciated--continue with Lantus, Metformin, medium dose sliding scale, straight Humalog, blood sugar checks  -We will discharge home, glucose kit ordered      ? Lantus 10 AM and 18 units nightly   ? Humalog 8 units with meals straight  ? Medium dose sliding scale AM, low dose scale PM  ? Metformin 500 mg BID         2.   Hyper cholesterolemia, hypertriglyceridemia  -Cholesterol admission 390, triglycerides up to 800  -Patient does have psoriatic arthritis treated with Taltz  -Continue Lipitor  -Fenofibrate and omega-3-per endocrine note I will start outpatient  -Endocrine note appreciated with strong recommendation of thought lifestyle changes and exercise     3.  Weight loss-could be secondary to onset of diabetes  -This in the setting of polyuria, polydipsia weakness and fatigue  -Continue follow iron studies, thyroid studies    Note that more than 30 minutes was spent in preparing discharge papers, discussing discharge with patient, medication review, etc.    Signed:  Electronically signed by STACY Patton on 11/4/2020 at 3:08 PM

## 2020-11-04 NOTE — PLAN OF CARE
Problem: Discharge Planning:  Goal: Discharged to appropriate level of care  Description: Discharged to appropriate level of care  Outcome: Met This Shift     Problem: Serum Glucose Level - Abnormal:  Goal: Ability to maintain appropriate glucose levels will improve  Description: Ability to maintain appropriate glucose levels will improve  11/4/2020 0237 by Lupe Qucik  Outcome: Met This Shift  11/3/2020 1316 by Drea Adams RN  Outcome: Ongoing     Problem: Sensory Perception - Impaired:  Goal: Ability to maintain a stable neurologic state will improve  Description: Ability to maintain a stable neurologic state will improve  11/4/2020 0237 by Lupe Quick  Outcome: Met This Shift  11/3/2020 1316 by Drea Adams RN  Outcome: Met This Shift

## 2020-11-04 NOTE — PROGRESS NOTES
Comprehensive Nutrition Assessment    Type and Reason for Visit:  Initial, Positive Nutrition Screen    Nutrition Recommendations/Plan: Continue current diet, Start Ensure HP BID    Nutrition Assessment:  Pt is at nutritional risk d/t noted subjective 75# wt loss x 10 months, likely r/t new onset DM. Provided Carb Controlled diet guidelines in the discharge instructions. Start Ensure HP BID and will monitor. Malnutrition Assessment:  Malnutrition Status: At risk for malnutrition (Comment)    Context:  Chronic Illness     Findings of the 6 clinical characteristics of malnutrition:  Energy Intake:  7 - 75% or less estimated energy requirements for 1 month or longer  Weight Loss:  Unable to assess(d/t no recent EMR wt hx on file)     Body Fat Loss:  Unable to assess     Muscle Mass Loss:  Unable to assess    Fluid Accumulation:  No significant fluid accumulation     Strength:  Not Performed    Estimated Daily Nutrient Needs:  Energy (kcal):  4929-9509; Weight Used for Energy Requirements:  Current     Protein (g):  80-95; Weight Used for Protein Requirements:  Current(1.2-1.4)        Fluid (ml/day):  2618-0868; Method Used for Fluid Requirements:  1 ml/kcal      Nutrition Related Findings:  pt alert, abd WDL, no noted edema, -I/Os      Wounds:  None       Current Nutrition Therapies:    DIET CARB CONTROL; Carb Control: 5 carb choices (75 gms)/meal    Anthropometric Measures:  · Height: 5' 9\" (175.3 cm)  · Current Body Weight: 149 lb (67.6 kg)   · Usual Body Weight: 212 lb (96.2 kg)(4/2017 actual per EMR)     · Ideal Body Weight: 160 lbs; % Ideal Body Weight 93.1 %   · BMI: 22  · BMI Categories: Normal Weight (BMI 18.5-24. 9)       Nutrition Diagnosis:   · Inadequate oral intake related to endocrine dysfuntion as evidenced by poor intake prior to admission    Nutrition Interventions:   Food and/or Nutrient Delivery:  Continue Current Diet, Start Oral Nutrition Supplement(Ensure HP BID)  Nutrition Education/Counseling:  No recommendation at this time   Coordination of Nutrition Care:  Continue to monitor while inpatient    Goals:  pt to consume >75% meals/ONS       Nutrition Monitoring and Evaluation:   Food/Nutrient Intake Outcomes:  Food and Nutrient Intake, Supplement Intake  Physical Signs/Symptoms Outcomes:  Biochemical Data, GI Status, Fluid Status or Edema, Nutrition Focused Physical Findings, Skin, Weight     Discharge Planning:     Too soon to determine     Electronically signed by Nury Jean MS, RD, LD on 11/4/20 at 2:13 PM EST    Contact: [phone]

## 2020-11-04 NOTE — PROGRESS NOTES
Reason for consult:  New DM    A1C: 19%     [] Not available                 Patient states the following concerns/barriers to diabetes self-management:       [x] None       [] Medication cost   [] Food cost/availability          [] Reading  [] Hearing   [] Vision                  [] Work    [] Transportation  [] No insurance    [] Physical limitations    [] Other:              Diabetes survival packet provided to:   [x] Patient     [] Other:    Information reviewed:   Definition of diabetes   Target glucose ranges/A1C   Self-monitoring of blood glucose   Prevention/symptoms/treatment of hypo-/hyperglycemia   Medication adherence   The plate method/meal planning guidelines   The benefits of exercise and recommendations   Reducing the risk of chronic complications            Comment:  Patient states he has been experiencing polyuria/polydipsia, tiredness, and blurred vision for the last few months. He has lost 65 pounds in the last 6 months. He states he has been drinking tons of sugary beverages, such as apple juice, and gallons of water daily. His cravings for sugar have diminished now that his glucose levels have improved. He states he gets plenty of physical activity while at work. He tends to eat all day long and enjoys snacking. He verbalizes a good understanding of the plate method and the importance of eating three, carb-controlled meals every 4.5 to 5 hours daily. Patient states that his pharmacy will not have his glucose meter available for pick-up until tomorrow at 2 p.m. I advised him to get a Reli-on glucose meter from [employer] ($10 for the meter, $10 for 50 test strips). He is agreeable to this, and will keep this glucose meter as a spare, or keep it at work. Diabetes medications reviewed (use, purpose, action, side effects): Humalog, Lantus, metformin. I showed patient how to assemble, dial, and inject using an insulin demo pen. Patient would prefer to use the insulin pens at home.   He is already comfortable giving himself injections because he takes Taltz at home. He understands the importance of site rotation, proper insulin storage, and sharps disposal.     Evaluation/Plan/Recommendations:   Patient's understanding of diabetes:   []Poor   [x]Fair    [x]Good   [] Excellent     Outpatient diabetes education is recommended:   [x] Yes  [] No   [] As needed  Patient is interested in outpatient diabetes education:   [x] Yes    [] No    [] Unsure    Recommended:     [] Consult to social work; patient has no insurance or financial hardship      [x] Script for glucometer and supplies (per preference of patient's insurance)               [x] Script for outpatient diabetes education classes from PCP    [x] Carbohydrate-controlled diet    [x] Patient prefers/educator recommends insulin pens instead of syringes        Thank you for this consult.     Braeden Peña, RN, BSN, [facility]

## 2020-11-04 NOTE — PROGRESS NOTES
ENDOCRINOLOGY INITIAL CONSULTATION NOTE      Date of admission: 11/2/2020  Date of service: 11/4/2020  Admitting physician: Riaz French MD   Primary Care Physician: Kevon Puentes MD  Consultant physician: Dee Dee Lyle MD     Reason for the consultation:  Newly diagnosed DM     History of Present Illness: The history is provided by the patient. Accuracy of the patient data is excellent    Marco Katz is a very pleasant 50 y.o. old male with PMH DM type 2, HLD admitted to [facility] on 11/2/2020 because of marked hyperglycemia and hyperTG , endocrine team was consulted for diabetes management. Pt denied any h/o DM in the past  Over the past few weeks he was c/o tiredness, fatigue polyuria and polydipsia     Admission labs: , AG 23, Bicarb 20 Cr 0.6, K 4.7, Beta-hydroxybutyrate 1.84,  TG 1774, lipase normal     Lab Results   Component Value Date    LABA1C 19.0 11/02/2020       subjective  Seen and examined, no acute issues overnight, for DC today     Inpatient diet:   Carb Restricted diet     Point of care glucose monitoring (Independently reviewed)   Recent Labs     11/03/20  0638 11/03/20  0820 11/03/20  1610 11/03/20  1851 11/03/20  2000 11/04/20  0619 11/04/20  1135 11/04/20  1634   GLUMET 238* 259* 371* 362* 317* 330* 223* 227*       Past medical history:   Past Medical History:   Diagnosis Date    Psoriasis        Past surgical history:  History reviewed. No pertinent surgical history. Social history:   Tobacco:   reports that he has never smoked. He has never used smokeless tobacco.  Alcohol:   reports current alcohol use. Drugs:   reports no history of drug use. Family history:    History reviewed. No pertinent family history.     Allergy and drug reactions:   No Known Allergies    Scheduled Meds:   insulin lispro  8 Units Subcutaneous TID WC    insulin glargine  10 Units Subcutaneous QAM    omega-3 acid ethyl esters  2 g Oral BID    metFORMIN  500 mg Oral BID WC    atorvastatin  20 mg Oral Nightly    insulin glargine  18 Units Subcutaneous Nightly    insulin lispro  0-12 Units Subcutaneous TID WC    insulin lispro  0-6 Units Subcutaneous Nightly     PRN Meds:   glucose, 15 g, PRN  dextrose, 12.5 g, PRN  glucagon (rDNA), 1 mg, PRN  dextrose, 100 mL/hr, PRN      Continuous Infusions:   dextrose      dextrose 5 % and 0.9 % NaCl 150 mL/hr at 11/03/20 0232       Review of Systems  All systems reviewed. All negative except for symptoms mentioned in HPI     OBJECTIVE    /62   Pulse 70   Temp 98 °F (36.7 °C) (Oral)   Resp 18   Ht 5' 9\" (1.753 m)   Wt 149 lb (67.6 kg)   SpO2 95%   BMI 22.00 kg/m²   No intake or output data in the 24 hours ending 11/04/20 1744    Physical examination:  General: awake alert, oriented x3, no abnormal position or movements. HEENT: normocephalic non-traumatic, no exophthalmos   Neck: supple, no LN enlargement, no thyromegaly, no thyroid tenderness, no JVD. Pulm: Clear equal air entry no added sounds, no wheezing or rhonchi    CVS: S1 + S2, no murmur, no heave  Abd: soft lax, no tenderness, no organomegaly, audible bowel sounds. Skin: warm, no lesions, no rash. Feet: sensory exam of the feet is present, No ulcers or open wounds.  Good peripheral pulses  Neuro: CN intact, muscle power normal  Psych: normal mood, and affect    Review of Laboratory Data:  I personally reviewed the following labs:   Recent Labs     11/02/20 1955 11/03/20  1105 11/04/20  0253   WBC 7.1 6.7 7.2   RBC 4.77 4.17 4.25   HGB 16.0 13.9 14.2   HCT 43.6 39.4 39.4   MCV 91.4 94.5 92.7   MCH 33.5 33.3 33.4   MCHC 36.7* 35.3* 36.0*   RDW 12.2 12.5 12.3    221 210   MPV 10.1 9.8 9.6     Recent Labs     11/02/20 1955 11/03/20  0331 11/04/20  0253   * 134 131*   K 4.7 4.0 3.6   CL 86* 101 98   CO2 24 24 24   BUN 17 12 9   CREATININE 0.8 0.5* 0.5*   GLUCOSE 727* 218* 273*   CALCIUM 9.7 8.4* 8.9   PROT 7.2 6.0* 5.7*   LABALBU 4.1 3.2* 3.3*   BILITOT 0.5 0.3 0.4   ALKPHOS 133* 88 89   AST 60* 111* 52*   ALT 67* 45* 55*     Beta-Hydroxybutyrate   Date Value Ref Range Status   11/02/2020 1.84 (H) 0.02 - 0.27 mmol/L Final     Lab Results   Component Value Date    LABA1C 19.0 11/02/2020     Lab Results   Component Value Date/Time    TSH 1.090 11/03/2020 03:31 AM    T4FREE 0.77 (L) 11/03/2020 03:31 AM     Lab Results   Component Value Date    LABA1C 19.0 11/02/2020    GLUCOSE 273 11/04/2020     Lab Results   Component Value Date    TRIG 814 11/03/2020    HDL 51 11/02/2020    [address] - 11/02/2020    CHOL 390 11/02/2020       Blood culture   No results found for: [facility]    Radiology:  No orders to display       Medical Records/Labs/Images review:   I personally reviewed and summarized previous records   All labs and imaging were reviewed independently     [facility]   Mallorie Martins, a 50 y.o.-old male seen today for inpatient diabetes management     Newly diagnosed DM   · A1c exteremly high 19%   · We recommend   · Lantus 10 AM and 18 units nightly   · Humalog 8 units with meals   · Medium dose sliding scale   · Metformin 500 mg BID   · Continue glucose check with meals and at bedtime   · Will titrate insulin dose based on the blood glucose trend & insulin requirement  · Pt will follow wit us after discharge. Endocrine follow up visit Wednesday 11/11 at 3:00PM     Hypertriglyceridemia    · Discussed the importance of controlling DM in treatment of hypertriglyceridemia   · Advised for strict lifestyle change, Wt loss, strict controlling DM, avoidance of concentrated sugar, complete avoidance of alcohol  · Start Fenofibrate 145 mg daily, Omega-3 FA 1 capsules BID  · I recommended a minimum of 150 minutes per WEEK of moderate intensity exercise    The above issues were reviewed with the patient who understood and agreed with the plan. Thank you for allowing us to participate in the care of this patient. Please do not hesitate to contact us with any additional questions.      Zulema Mckeon MD  Endocrinologist, [place] [address] [facility]   [address] [place] [address]   Phone: [phone]  Fax: [phone]  --------------------------------------------  An electronic signature was used to authenticate this note.  Jose Manuel Lamar MD on 11/4/2020 at 5:44 PM

## 2020-11-04 NOTE — PROGRESS NOTES
Will take care of home reconciliation along with glucose kit when ready for discharge, awaiting diabetic education and final input from endocrinology.   Possibly DC later today

## 2020-11-04 NOTE — DISCHARGE INSTR - DIET
 Good nutrition is important when healing from an illness, injury, or surgery. Follow any nutrition recommendations given to you during your hospital stay.  If you were given an oral nutrition supplement while in the hospital, continue to take this supplement at home. You can take it with meals, in-between meals, and/or before bedtime. These supplements can be purchased at most local grocery stores, pharmacies, and chain super-stores.  If you have any questions about your diet or nutrition, call the hospital and ask for the dietitian. Carbohydrate Controlled Diet    Foods with carbohydrates make your blood glucose level go up. Learning how to count carbohydrates can help you control your blood glucose levels. First, identify the foods you eat that contain carbohydrates. Then, using the Foods with Carbohydrates chart, determine about how much carbohydrates are in your meals and snacks. Make sure you are eating portions of fiber, protein, and healthy fat along with your carbohydrate foods. Foods with Carbohydrates  The following table show carbohydrate foods that have about 15 grams of carbohydrate each. Using measuring cups, spoons, or a food scale when you first begin learning about carbohydrate counting can help you learn about the portion sizes you typically eat.     The following foods have 15 grams carbohydrate each:   Grains  1 slice bread (1 ounce)   1 small tortilla (6-inch size)   ¼ large bagel (1 ounce)   1/3 cup pasta or rice (cooked)   ½ hamburger or hot dog bun (¾ ounce)   ½ cup cooked cereal   ½ to ¾ cup ready-to-eat cereal   1 slice bread (1 ounce)   1 small tortilla (6-inch size)   2 taco shells (5-inch size) Fruit  1 small fresh fruit (¾ to 1 cup)   4-inch piece of banana   17 small grapes (3 ounces)   1 cup melon or berries   ½ cup canned or frozen fruit   2 tablespoons dried fruit (blueberries, cherries, cranberries, raisins)   ½ cup unsweetened fruit juice   ½ cup canned or frozen fruit   1 small fresh fruit (¾ to 1 cup)   1 cup melon or berries   17 small grapes (3 ounces)   Starchy Vegetables  ½ cup cooked beans, peas, corn, potatoes/sweet potatoes   ¼ large baked potato (3 ounces)   1 cup acorn or butternut squash  Snack Foods  3 to 6 crackers   8 potato chips or 13 tortilla chips (¾ ounce to 1 ounce)   3 cups popped popcorn   Dairy  2/3 cup (6 ounces) nonfat plain yogurt, or yogurt with sugar-free sweetener   1 cup milk   1 cup plain rice, soy, coconut or flavored almond milk Sweets and Desserts   ½ cup ice cream or frozen yogurt   1 tablespoon jam, jelly, pancake syrup, table sugar, or honey   2 tablespoons light pancake syrup   1 inch square of frosted cake or 2 inch square of unfrosted cake   2 small cookies (2/3 ounce each) or ¼ large cookie   ½ cup ice cream or frozen yogurt     Sometimes youll have to estimate carbohydrate amounts if you dont know the exact recipe. One cup of mixed foods like soups can have 1 to 2 carbohydrate servings, while some casseroles might have 2 or more servings of carbohydrate. Foods that have less than 20 calories in each serving can be counted as free foods. Count 1 cup raw vegetables, or ½ cup cooked nonstarchy vegetables as free foods. If you eat 3 or more servings at one meal, then count them as 1 carbohydrate serving. Foods without Carbohydrates   Not all foods contain carbohydrates. Meat, dairy, fats, most vegetables, and many beverages dont contain carbohydrate. So when you count carbohydrates, you can generally exclude chicken, pork, beef, fish, seafood, eggs, tofu, cheese, butter, sour cream, avocado, nuts, seeds, olives, mayonnaise, water, black coffee, unsweetened tea, and zero-calorie drinks. Vegetables with no or low carbohydrate include green beans, cauliflower, tomatoes and onions. How much carbohydrate should I eat at each meal?   Carbohydrate counting can help you plan your meals and manage your weight.  Following are some some diet drinks do have calories and will raise your blood glucose. Label Reading Tips  Read Nutrition Facts labels to find out how many grams of carbohydrate are in a food you want to eat. Dont forget: sometimes serving sizes on the label arent the same as how much food you are going to eat, so you may need to calculate how much carbohydrate is in the food you are serving yourself.      Carbohydrate Counting for People with Diabetes Sample 1-Day Menu View Nutrient Info   Breakfast ¾ cup yogurt, low fat, low sugar (1 carbohydrate serving)   ½ cup cereal, ready-to-eat, unsweetened (1 carbohydrate serving)   1 cup strawberries (1 carbohydrate serving)   ¼ cup almonds (½ carbohydrate serving)   Lunch 1, 5 ounce can chunk light tuna   2 ounces cheese, low fat cheddar   6 whole wheat crackers (1 carbohydrate serving)   1 small apple (1½ carbohydrate servings)   ½ cup carrots (½ carbohydrate serving)   ½ cup snap peas   1 cup 1% milk (1 carbohydrate serving)    Evening Meal Stir hays made with: 3 ounces chicken   1 cup brown rice (3 carbohydrate servings)   ½ cup broccoli (½ carbohydrate serving)   ½ cup green beans   ¼ cup onions   1 tablespoon olive oil   2 tablespoons teriyaki sauce (½ carbohydrate serving)   Evening Snack 1 extra small banana (1 carbohydrate serving)   1 tablespoon peanut butter

## 2020-11-05 ENCOUNTER — TELEPHONE (OUTPATIENT)
Dept: PRIMARY CARE CLINIC | Age: 45
End: 2020-11-05

## 2020-11-05 LAB — GLUTAMIC ACID DECARB AB: <5 IU/ML (ref 0–5)

## 2020-11-05 RX ORDER — GLUCOSAMINE HCL/CHONDROITIN SU 500-400 MG
CAPSULE ORAL
Qty: 100 STRIP | Refills: 2 | Status: SHIPPED | OUTPATIENT
Start: 2020-11-05

## 2020-11-05 RX ORDER — GLUCOSAMINE HCL/CHONDROITIN SU 500-400 MG
CAPSULE ORAL
Qty: 100 STRIP | Refills: 2 | Status: SHIPPED
Start: 2020-11-05 | End: 2020-11-05 | Stop reason: SDUPTHER

## 2020-11-05 NOTE — TELEPHONE ENCOUNTER
[facility] Trans[facility] Initial Follow Up Call    Outreach made within 2 business days of discharge: Yes    Patient: Jen Smith Patient : 1975   MRN: [id number]  Reason for Admission: There are no discharge diagnoses documented for the most recent discharge. Discharge Date: 20       Spoke with: Filippo Carranza    Discharge department/facility: home    TCM Interactive Patient Contact:  Was patient able to fill all prescriptions: Yes. Only machine was called in. Did not get strips or lancets. Was patient instructed to bring all medications to the follow-up visit: Yes  Is patient taking all medications as directed in the discharge summary?  Yes  Does patient understand their discharge instructions: Yes  Does patient have questions or concerns that need addressed prior to 7-14 day follow up office visit: no    Scheduled appointment with PCP within 7-14 days    Follow Up  Future Appointments   Date Time Provider Reilly Banks   2020  3:00 PM Roderick Sandhoff, MD [facility]   2020  8:00 AM Alka Patel MD [facility]

## 2020-11-06 LAB — HBA1C MFR BLD: >16.5 %

## 2020-11-11 ENCOUNTER — VIRTUAL VISIT (OUTPATIENT)
Dept: ENDOCRINOLOGY | Age: 45
End: 2020-11-11
Payer: COMMERCIAL

## 2020-11-11 PROCEDURE — 99214 OFFICE O/P EST MOD 30 MIN: CPT | Performed by: INTERNAL MEDICINE

## 2020-11-11 RX ORDER — METFORMIN HYDROCHLORIDE 500 MG/1
1000 TABLET, EXTENDED RELEASE ORAL 2 TIMES DAILY
Qty: 120 TABLET | Refills: 5 | Status: SHIPPED
Start: 2020-11-11 | End: 2021-10-19 | Stop reason: SDUPTHER

## 2020-11-11 RX ORDER — GLIPIZIDE 5 MG/1
5 TABLET, FILM COATED, EXTENDED RELEASE ORAL DAILY
Qty: 30 TABLET | Refills: 3 | Status: SHIPPED
Start: 2020-11-11 | End: 2020-12-29 | Stop reason: SDUPTHER

## 2020-11-11 NOTE — PROGRESS NOTES
Ernesto Puente was read the following message We want to confirm that, for purposes of billing, this is a virtual visit with your provider for which we will submit a claim for reimbursement with your insurance company. You will be responsible for any copays, coinsurance amounts or other amounts not covered by your insurance company. If you do not accept this, unfortunately we will not be able to schedule a virtual visit with the provider. Do you accept?  Saundra Rahman responded Soy Carter

## 2020-11-11 NOTE — PROGRESS NOTES
[address] [place] [address] Department of Endocrinology Diabetes and Metabolism   [address] [facility] [address]   Phone: [phone]  Fax: [phone]    Date of Service: 11/11/2020    Primary Care Physician: Ayala Duncan MD  Referring physician: No ref. provider found  Provider: Karina Sanchez MD     Reason for the visit:  Newly diagnosed DM type 2     History of Present Illness: The history is provided by the patient. No  was used. Accuracy of the patient data is excellent. Elizabeth Reynolds is a very pleasant 50 y.o. male seen today for diabetes management     Elizabeth Reynolds was diagnosed with diabetes in 10/2020, at jenna time he was admitted to hospital with , AG 23, Bicarb 20 Cr 0.6, K 4.7, Beta-hydroxybutyrate 1.84,    The patient is currently on Lantus 10 units am, 18 units at night, Humalog 8 units with meals + ss 2:50>150, Metformin 500 mg BID   The patient has been checking blood sugar 4 times a day and readings still above goal   Most recent A1c results summarized below  Lab Results   Component Value Date    LABA1C >16.5 11/03/2020    LABA1C 19.0 11/02/2020     Patient has had no hypoglycemic episodes   The patient has been mindful of what has been eating and following diabetic diet as encouraged  I reviewed current medications and the patient has no issues with diabetes medications  The patient is due for an eye exam  The patient performs his own feet care  Microvascular complications:  No Retinopathy, Nephropathy or Neuropathy   Macrovascular complications: no CAD, PVD, or Stroke    PAST MEDICAL HISTORY   Past Medical History:   Diagnosis Date    Psoriasis        PAST SURGICAL HISTORY   No past surgical history on file. SOCIAL HISTORY   Tobacco:   reports that he has never smoked. He has never used smokeless tobacco.  Alcohol:   reports current alcohol use. Drugs:   reports no history of drug use.     FAMILY HISTORY   No family history on file.    ALLERGIES AND DRUG REACTIONS   No Known Allergies    CURRENT MEDICATIONS   Current Outpatient Medications   Medication Sig Dispense Refill    glipiZIDE (GLUCOTROL XL) 5 MG extended release tablet Take 1 tablet by mouth daily 30 tablet 3    metFORMIN (GLUCOPHAGE-XR) 500 MG extended release tablet Take 2 tablets by mouth 2 times daily 120 tablet 5    Lancets 30G MISC 1 each by Does not apply route daily 100 each 3    blood glucose monitor strips Test 3 times a day & as needed for symptoms of irregular blood glucose. Dispense sufficient amount for indicated testing frequency plus additional to accommodate PRN testing needs. 100 strip 2    insulin glargine (LANTUS) 100 UNIT/ML injection vial Inject 10 Units into the skin every morning 1 vial 3    insulin glargine (LANTUS) 100 UNIT/ML injection vial Inject 18 Units into the skin nightly 1 vial 3    insulin lispro (HUMALOG) 100 UNIT/ML injection vial Inject 8 Units into the skin 3 times daily (with meals) (Patient taking differently: Inject 8 Units into the skin 3 times daily (with meals) PLUS SS) 1 vial 3    atorvastatin (LIPITOR) 20 MG tablet Take 1 tablet by mouth nightly 30 tablet 3    glucose monitoring kit (FREESTYLE) monitoring kit 1 kit by Does not apply route daily 1 kit 0    omega-3 acid ethyl esters (LOVAZA) 1 g capsule Take 2 capsules by mouth 2 times daily 60 capsule 3    fenofibrate (TRICOR) 145 MG tablet Take 1 tablet by mouth daily 30 tablet 3    ixekizumab (TALTZ) 80 MG/ML SOAJ prefilled syringe Inject 80 mg into the skin once      insulin lispro (HUMALOG) 100 UNIT/ML injection vial Inject 0-12 Units into the skin 3 times daily (with meals) (Patient not taking: Reported on 11/11/2020) 1 vial 3    insulin lispro (HUMALOG) 100 UNIT/ML injection vial Inject 0-6 Units into the skin nightly (Patient not taking: Reported on 11/11/2020) 1 vial 3     No current facility-administered medications for this visit.         Review of 9.6 11/04/2020 02:53 AM      Lab Results   Component Value Date/Time     (L) 11/04/2020 02:53 AM    K 3.6 11/04/2020 02:53 AM    K 4.7 11/02/2020 07:55 PM    CO2 24 11/04/2020 02:53 AM    BUN 9 11/04/2020 02:53 AM    CREATININE 0.5 (L) 11/04/2020 02:53 AM    CALCIUM 8.9 11/04/2020 02:53 AM    LABGLOM >60 11/04/2020 02:53 AM    GFRAA >60 11/04/2020 02:53 AM      Lab Results   Component Value Date/Time    TSH 1.090 11/03/2020 03:31 AM    T4FREE 0.77 (L) 11/03/2020 03:31 AM     Lab Results   Component Value Date    LABA1C >16.5 11/03/2020    GLUCOSE 273 11/04/2020     Lab Results   Component Value Date    LABA1C >16.5 11/03/2020    LABA1C 19.0 11/02/2020     Lab Results   Component Value Date    TRIG 814 11/03/2020    HDL 51 11/02/2020    [address] - 11/02/2020    CHOL 390 11/02/2020     No results found for: [facility] [address] Records/Labs/Images review:   I personally reviewed and summarized previous records   All labs and imaging studies were independently reviewed     ASSESSMENT & RECOMMENDATIONS   Fany Costa, a 50 y.o.-old male seen in for the following issues     Newly diagnosed Diabetes Mellitus Type 2   · Patient's diabetes is uncontrol   · Will change DM regimen to Metformin  mg 2 tab BID, Glipizide ER 5 mg daily, Lantus 10 units AM, 18 units at bedtime  · Stop Humalog   · Plan to stop insulin completely in few weeks   · The patient was advised to check blood sugars 4 times a day before meals and at bedtime and send BS readings to our office in a week. · Discussed with patient A1c and blood sugar goals   · Patient will need routine diabetes maintenance and prevention  · Diabetes labs before next visit     Dietary noncompliance   Improved since recent hospitalization    Discussed with patient the importance of eating consistent carbohydrate meals, avoiding high glycemic index food.  Also, discussed with patient the risk and negative consequences of dietary noncompliance on blood glucose control, blood pressure and weight    HLD  · On Lipitor 20 mg daily     Return in about 6 weeks (around 12/23/2020) for newly diagnsoed DM . The above issues were reviewed with the patient who understood and agreed with the plan. Thank you for allowing us to participate in the care of this patient. Please do not hesitate to contact us with any additional questions. Diagnosis Orders   1. Type 2 diabetes mellitus without complication, unspecified whether long term insulin use (HCC)  glipiZIDE (GLUCOTROL XL) 5 MG extended release tablet    metFORMIN (GLUCOPHAGE-XR) 500 MG extended release tablet     Lesvia Martinez MD  Endocrinologist, [facility] Diabetes Care and Endocrinology   [address] [place] [address]   Phone: [phone]  Fax: [phone]  --------------------------------------------  An electronic signature was used to authenticate this note.  Alberto Mccloud MD on 11/11/2020 at 4:01 PM

## 2020-11-13 ENCOUNTER — OFFICE VISIT (OUTPATIENT)
Dept: FAMILY MEDICINE CLINIC | Age: 45
End: 2020-11-13
Payer: COMMERCIAL

## 2020-11-13 VITALS
HEART RATE: 84 BPM | OXYGEN SATURATION: 93 % | DIASTOLIC BLOOD PRESSURE: 80 MMHG | BODY MASS INDEX: 24.34 KG/M2 | WEIGHT: 164.8 LBS | TEMPERATURE: 97.7 F | SYSTOLIC BLOOD PRESSURE: 124 MMHG

## 2020-11-13 PROCEDURE — 99495 TRANSJ CARE MGMT MOD F2F 14D: CPT | Performed by: FAMILY MEDICINE

## 2020-11-13 PROCEDURE — 1111F DSCHRG MED/CURRENT MED MERGE: CPT | Performed by: FAMILY MEDICINE

## 2020-11-13 ASSESSMENT — ENCOUNTER SYMPTOMS
WHEEZING: 0
VOMITING: 0
NAUSEA: 0
SHORTNESS OF BREATH: 0

## 2020-11-13 NOTE — PROGRESS NOTES
Post-Discharge Transitional Care Management Services or Hospital Follow Up      Ammon Lennon   YOB: 1975    Date of Office Visit:  11/13/2020  Date of Hospital Admission: 11/2/20  Date of Hospital Discharge: 11/4/20  Readmission Risk Score(high >=14%. Medium >=10%):No data recorded    Care management risk score Rising risk (score 2-5) and Complex Care (Scores >=6): 1     Non face to face  following discharge, date last encounter closed (first attempt may have been earlier): 11/5/2020 10:43 AM 11/5/2020 10:43 AM    Call initiated 2 business days of discharge: Yes     Patient Active Problem List   Diagnosis    P[facility]iatic arthritis ([facility] [address]    Hyperglycemia    Fatty liver    New onset type 2 diabetes mellitus ([facility] [address]    Hypertriglyceridemia    Weight loss, unintentional       No Known Allergies    Medications listed as ordered at the time of discharge from [facility] [place]   [place], [address] Medication Instructions CHRIS:    Printed on:11/13/20 2867   Medication Information                      atorvastatin (LIPITOR) 20 MG tablet  Take 1 tablet by mouth nightly             blood glucose monitor strips  Test 3 times a day & as needed for symptoms of irregular blood glucose. Dispense sufficient amount for indicated testing frequency plus additional to accommodate PRN testing needs.              fenofibrate (TRICOR) 145 MG tablet  Take 1 tablet by mouth daily             glipiZIDE (GLUCOTROL XL) 5 MG extended release tablet  Take 1 tablet by mouth daily             glucose monitoring kit (FREESTYLE) monitoring kit  1 kit by Does not apply route daily             insulin glargine (LANTUS) 100 UNIT/ML injection vial  Inject 10 Units into the skin every morning             insulin glargine (LANTUS) 100 UNIT/ML injection vial  Inject 18 Units into the skin nightly             ixekizumab (TALTZ) 80 MG/ML SOAJ prefilled syringe  Inject 80 mg into the skin once             Lancets 30G MISC  1 each by Does not apply route daily             metFORMIN (GLUCOPHAGE-XR) 500 MG extended release tablet  Take 2 tablets by mouth 2 times daily             omega-3 acid ethyl esters (LOVAZA) 1 g capsule  Take 2 capsules by mouth 2 times daily                   Medications marked \"taking\" at this time  Outpatient Medications Marked as Taking for the 11/13/20 encounter (Office Visit) with Ernestina Almodovar MD   Medication Sig Dispense Refill    glipiZIDE (GLUCOTROL XL) 5 MG extended release tablet Take 1 tablet by mouth daily 30 tablet 3    metFORMIN (GLUCOPHAGE-XR) 500 MG extended release tablet Take 2 tablets by mouth 2 times daily 120 tablet 5    Lancets 30G MISC 1 each by Does not apply route daily 100 each 3    blood glucose monitor strips Test 3 times a day & as needed for symptoms of irregular blood glucose. Dispense sufficient amount for indicated testing frequency plus additional to accommodate PRN testing needs. 100 strip 2    insulin glargine (LANTUS) 100 UNIT/ML injection vial Inject 10 Units into the skin every morning 1 vial 3    insulin glargine (LANTUS) 100 UNIT/ML injection vial Inject 18 Units into the skin nightly 1 vial 3    atorvastatin (LIPITOR) 20 MG tablet Take 1 tablet by mouth nightly 30 tablet 3    glucose monitoring kit (FREESTYLE) monitoring kit 1 kit by Does not apply route daily 1 kit 0    omega-3 acid ethyl esters (LOVAZA) 1 g capsule Take 2 capsules by mouth 2 times daily 60 capsule 3    fenofibrate (TRICOR) 145 MG tablet Take 1 tablet by mouth daily 30 tablet 3    ixekizumab (TALTZ) 80 MG/ML SOAJ prefilled syringe Inject 80 mg into the skin once          Medications patient taking as of now reconciled against medications ordered at time of hospital discharge: Yes    Chief Complaint   Patient presents with    Diabetes       HPI    Inpatient course: Discharge summary reviewed- see chart.     Interval history/Current status: Saw Dr. Rica Plasencia  Humalog stopped  Metformin ER 1000 mg bid  Glipizide 5 mg  Lantus as Rx  Polyuria improved    Review of Systems   Constitutional: Negative for chills and fever. Respiratory: Negative for shortness of breath and wheezing. Cardiovascular: Negative for chest pain and palpitations. Gastrointestinal: Negative for nausea and vomiting. Genitourinary: Negative for dysuria, hematuria and urgency. Skin: Negative for rash. Neurological: Negative for dizziness and light-headedness. Vitals:    11/13/20 0758   BP: 124/80   Pulse: 84   Temp: 97.7 °F (36.5 °C)   SpO2: 93%   Weight: 164 lb 12.8 oz (74.8 kg)     Body mass index is 24.34 kg/m². Wt Readings from Last 3 Encounters:   11/13/20 164 lb 12.8 oz (74.8 kg)   11/02/20 149 lb (67.6 kg)   11/02/20 149 lb (67.6 kg)     BP Readings from Last 3 Encounters:   11/13/20 124/80   11/04/20 100/62   11/02/20 110/72       Physical Exam  Constitutional:       Appearance: Normal appearance. HENT:      Head: Normocephalic and atraumatic. Eyes:      Extraocular Movements: Extraocular movements intact. Conjunctiva/sclera: Conjunctivae normal.   Cardiovascular:      Rate and Rhythm: Normal rate. Heart sounds: Normal heart sounds. Pulmonary:      Effort: Pulmonary effort is normal.      Breath sounds: Normal breath sounds. Skin:     General: Skin is warm. Neurological:      Mental Status: He is alert and oriented to person, place, and time. Psychiatric:         Mood and Affect: Mood normal.         Behavior: Behavior normal.             Assessment/Plan:  1. New onset type 2 diabetes mellitus ([facility] [address]  As above  - [place] DISCHARGE MEDS RECONCILED W/ CURRENT OUTPATIENT MED LIST    2.  Psoriatic arthritis ([facility] [address]  Continue med per rheum        Medical Decision Making: moderate complexity

## 2020-11-13 NOTE — PROGRESS NOTES
[address] [place] [address] presents to the office today for   Chief Complaint   Patient presents with    Diabetes       Review of Systems     Wt 164 lb 12.8 oz (74.8 kg)   BMI 24.34 kg/m²   Physical Exam       Current Outpatient Medications:     glipiZIDE (GLUCOTROL XL) 5 MG extended release tablet, Take 1 tablet by mouth daily, Disp: 30 tablet, Rfl: 3    metFORMIN (GLUCOPHAGE-XR) 500 MG extended release tablet, Take 2 tablets by mouth 2 times daily, Disp: 120 tablet, Rfl: 5    Lancets 30G MISC, 1 each by Does not apply route daily, Disp: 100 each, Rfl: 3    blood glucose monitor strips, Test 3 times a day & as needed for symptoms of irregular blood glucose. Dispense sufficient amount for indicated testing frequency plus additional to accommodate PRN testing needs. , Disp: 100 strip, Rfl: 2    insulin glargine (LANTUS) 100 UNIT/ML injection vial, Inject 10 Units into the skin every morning, Disp: 1 vial, Rfl: 3    insulin glargine (LANTUS) 100 UNIT/ML injection vial, Inject 18 Units into the skin nightly, Disp: 1 vial, Rfl: 3    atorvastatin (LIPITOR) 20 MG tablet, Take 1 tablet by mouth nightly, Disp: 30 tablet, Rfl: 3    glucose monitoring kit (FREESTYLE) monitoring kit, 1 kit by Does not apply route daily, Disp: 1 kit, Rfl: 0    omega-3 acid ethyl esters (LOVAZA) 1 g capsule, Take 2 capsules by mouth 2 times daily, Disp: 60 capsule, Rfl: 3    fenofibrate (TRICOR) 145 MG tablet, Take 1 tablet by mouth daily, Disp: 30 tablet, Rfl: 3    ixekizumab (TALTZ) 80 MG/ML SOAJ prefilled syringe, Inject 80 mg into the skin once, Disp: , Rfl:      Past Medical History:   Diagnosis Date    Psoriasis        There are no diagnoses linked to this encounter.      Del Boggs MD

## 2020-11-20 ENCOUNTER — TELEPHONE (OUTPATIENT)
Dept: ENDOCRINOLOGY | Age: 45
End: 2020-11-20

## 2020-11-22 NOTE — TELEPHONE ENCOUNTER
Change night dose of Lantus from 18 to 20 units     Also, BS consistently running high in the morning , please check if eat late at night,

## 2020-12-29 ENCOUNTER — VIRTUAL VISIT (OUTPATIENT)
Dept: ENDOCRINOLOGY | Age: 45
End: 2020-12-29
Payer: COMMERCIAL

## 2020-12-29 PROCEDURE — 99214 OFFICE O/P EST MOD 30 MIN: CPT | Performed by: INTERNAL MEDICINE

## 2020-12-29 RX ORDER — GLIPIZIDE 5 MG/1
5 TABLET, FILM COATED, EXTENDED RELEASE ORAL 2 TIMES DAILY
Qty: 180 TABLET | Refills: 3 | Status: SHIPPED
Start: 2020-12-29 | End: 2021-10-19 | Stop reason: SDUPTHER

## 2020-12-29 NOTE — PROGRESS NOTES
[address] [place] [address] Department of Endocrinology Diabetes and Metabolism   [address] [facility] [address]   Phone: [phone]  Fax: [phone]    Date of Service: 12/29/2020    Primary Care Physician: Kevon Puentes MD  Referring physician: No ref. provider found  Provider: Dee Dee Lyle MD     Reason for the visit:  Newly diagnosed DM type 2     History of Present Illness: The history is provided by the patient. No  was used. Accuracy of the patient data is excellent. Marco Katz is a very pleasant 50 y.o. male seen today for diabetes management     Marco Katz was diagnosed with diabetes in 10/2020, at jenna time he was admitted to hospital with , AG 23, Bicarb 20 Cr 0.6, K 4.7, Beta-hydroxybutyrate 1.84,    The patient is currently on Lantus 10 units am, 20 units at night, HMetformin  mg 2 tab BID   The patient has been checking blood sugar 4 times a day and readings still above goal   Most recent A1c results summarized below  Lab Results   Component Value Date    LABA1C >16.5 11/03/2020    LABA1C 19.0 11/02/2020     Patient has had no hypoglycemic episodes   The patient has been mindful of what has been eating and following diabetic diet as encouraged  I reviewed current medications and the patient has no issues with diabetes medications  The patient is due for an eye exam  The patient performs his own feet care  Microvascular complications:  No Retinopathy, Nephropathy or Neuropathy   Macrovascular complications: no CAD, PVD, or Stroke    PAST MEDICAL HISTORY   Past Medical History:   Diagnosis Date    Psoriasis        PAST SURGICAL HISTORY   No past surgical history on file. SOCIAL HISTORY   Tobacco:   reports that he has never smoked. He has never used smokeless tobacco.  Alcohol:   reports current alcohol use. Drugs:   reports no history of drug use. FAMILY HISTORY   No family history on file.     ALLERGIES AND DRUG REACTIONS   No no numbness, no tingling, no weakness, _    OBJECTIVE    There were no vitals taken for this visit. BP Readings from Last 4 Encounters:   11/13/20 124/80   11/04/20 100/62   11/02/20 110/72     Wt Readings from Last 6 Encounters:   11/13/20 164 lb 12.8 oz (74.8 kg)   11/02/20 149 lb (67.6 kg)   11/02/20 149 lb (67.6 kg)     Physical examination:  Due to this being a TeleHealth encounter, evaluation of the following organ systems is limited: Vitals/Constitutional/EENT/Resp/CV/GI//MS/Neuro/Skin/Heme-Lymph-Imm. Modified physical exam through Telemedicine camera    General: Communicating well via camera   Neck: no obvious neck mass. No obvious neck deformity     CVS: no distress   Chest: no distress. Chest is moving with respiration    Extremities:  no visible tremor  Skin: No visible rashes as seen from camera   Musculoskeletal: no visible deformity  Neuro: Alert and oriented to person, place, and time. Psychiatric: Normal mood and affect.  Behavior is normal      Review of Laboratory Data:  I personally reviewed the following lab:  Lab Results   Component Value Date/Time    WBC 7.2 11/04/2020 02:53 AM    RBC 4.25 11/04/2020 02:53 AM    HGB 14.2 11/04/2020 02:53 AM    HCT 39.4 11/04/2020 02:53 AM    MCV 92.7 11/04/2020 02:53 AM    MCH 33.4 11/04/2020 02:53 AM    MCHC 36.0 (H) 11/04/2020 02:53 AM    RDW 12.3 11/04/2020 02:53 AM     11/04/2020 02:53 AM    MPV 9.6 11/04/2020 02:53 AM      Lab Results   Component Value Date/Time     (L) 11/04/2020 02:53 AM    K 3.6 11/04/2020 02:53 AM    K 4.7 11/02/2020 07:55 PM    CO2 24 11/04/2020 02:53 AM    BUN 9 11/04/2020 02:53 AM    CREATININE 0.5 (L) 11/04/2020 02:53 AM    CALCIUM 8.9 11/04/2020 02:53 AM    LABGLOM >60 11/04/2020 02:53 AM    GFRAA >60 11/04/2020 02:53 AM      Lab Results   Component Value Date/Time    TSH 1.090 11/03/2020 03:31 AM    T4FREE 0.77 (L) 11/03/2020 03:31 AM     Lab Results   Component Value Date    LABA1C >16.5 11/03/2020 GLUCOSE 273 11/04/2020     Lab Results   Component Value Date    LABA1C >16.5 11/03/2020    LABA1C 19.0 11/02/2020     Lab Results   Component Value Date    TRIG 814 11/03/2020    HDL 51 11/02/2020    [address] - 11/02/2020    CHOL 390 11/02/2020     No results found for: [facility] [address] Records/Labs/Images review:   I personally reviewed and summarized previous records   All labs and imaging studies were independently reviewed     ASSESSMENT & RECOMMENDATIONS   El Malone, a 50 y.o.-old male seen in for the following issues     Newly diagnosed Diabetes Mellitus Type 2   · Patient DM under good control   · Stop all insulin   · Will change DM regimen to Metformin  mg 2 tab BID, Glipizide ER 5 mg BID,   · The patient was advised to check blood sugars 2-3 times a day before meals and at bedtime and send BS readings to our office in a week. · Discussed with patient A1c and blood sugar goals   · Patient will need routine diabetes maintenance and prevention  · Diabetes labs before next visit     Dietary noncompliance   Improved since recent hospitalization    Discussed with patient the importance of eating consistent carbohydrate meals, avoiding high glycemic index food. Also, discussed with patient the risk and negative consequences of dietary noncompliance on blood glucose control, blood pressure and weight    HLD  · On Lipitor 20 mg daily     Return in about 4 months (around 4/29/2021) for DM type 2 . The above issues were reviewed with the patient who understood and agreed with the plan. Thank you for allowing us to participate in the care of this patient. Please do not hesitate to contact us with any additional questions. Diagnosis Orders   1. Dietary noncompliance     2.  Type 2 diabetes mellitus without complication, unspecified whether long term insulin use (HCC)  glipiZIDE (GLUCOTROL XL) 5 MG extended release tablet    Comprehensive Metabolic Panel    Hemoglobin A1C    Lipid Panel Microalbumin / Creatinine Urine Ratio   3. Vitamin D deficiency  Vitamin D 25 Hydroxy   4. Hyperlipidemia, unspecified hyperlipidemia type  Lipid Panel     Sia Clancy MD  Endocrinologist, [facility]   [address] [place] [address]   Phone: [phone]  Fax: [phone]  ---------------------  An electronic signature was used to authenticate this note. 200 Alexi Colon MD on 12/29/2020 at 8:39 AM    This visit was performed as a virtual video visit using a synchronous, two-way, audio-video telehealth technology platform  This Virtual  Visit was conducted, with patient's consent, to reduce the patient's risk of exposure to COVID-19 and provide continuity of care.

## 2021-01-08 ENCOUNTER — TELEPHONE (OUTPATIENT)
Dept: ENDOCRINOLOGY | Age: 46
End: 2021-01-08

## 2021-01-14 LAB
ALBUMIN SERPL-MCNC: 4.6 G/DL (ref 3.5–5.2)
ALP BLD-CCNC: 46 U/L (ref 40–129)
ALT SERPL-CCNC: 36 U/L (ref 0–40)
ANION GAP SERPL CALCULATED.3IONS-SCNC: 10 MMOL/L (ref 7–16)
AST SERPL-CCNC: 37 U/L (ref 0–39)
BASOPHILS ABSOLUTE: 0.03 E9/L (ref 0–0.2)
BASOPHILS RELATIVE PERCENT: 0.5 % (ref 0–2)
BILIRUB SERPL-MCNC: 0.4 MG/DL (ref 0–1.2)
BILIRUBIN DIRECT: <0.2 MG/DL (ref 0–0.3)
BILIRUBIN, INDIRECT: NORMAL MG/DL (ref 0–1)
BUN BLDV-MCNC: 13 MG/DL (ref 6–20)
CALCIUM SERPL-MCNC: 9 MG/DL (ref 8.6–10.2)
CHLORIDE BLD-SCNC: 101 MMOL/L (ref 98–107)
CO2: 25 MMOL/L (ref 22–29)
CREAT SERPL-MCNC: 0.8 MG/DL (ref 0.7–1.2)
EOSINOPHILS ABSOLUTE: 0.06 E9/L (ref 0.05–0.5)
EOSINOPHILS RELATIVE PERCENT: 1.1 % (ref 0–6)
GFR AFRICAN AMERICAN: >60
GFR NON-AFRICAN AMERICAN: >60 ML/MIN/1.73
GLUCOSE BLD-MCNC: 151 MG/DL (ref 74–99)
HCT VFR BLD CALC: 42.8 % (ref 37–54)
HEMOGLOBIN: 15 G/DL (ref 12.5–16.5)
IMMATURE GRANULOCYTES #: 0.01 E9/L
IMMATURE GRANULOCYTES %: 0.2 % (ref 0–5)
LYMPHOCYTES ABSOLUTE: 1.16 E9/L (ref 1.5–4)
LYMPHOCYTES RELATIVE PERCENT: 20.9 % (ref 20–42)
MCH RBC QN AUTO: 32.5 PG (ref 26–35)
MCHC RBC AUTO-ENTMCNC: 35 % (ref 32–34.5)
MCV RBC AUTO: 92.8 FL (ref 80–99.9)
MONOCYTES ABSOLUTE: 1.25 E9/L (ref 0.1–0.95)
MONOCYTES RELATIVE PERCENT: 22.6 % (ref 2–12)
NEUTROPHILS ABSOLUTE: 3.03 E9/L (ref 1.8–7.3)
NEUTROPHILS RELATIVE PERCENT: 54.7 % (ref 43–80)
PDW BLD-RTO: 11.8 FL (ref 11.5–15)
PLATELET # BLD: 229 E9/L (ref 130–450)
PMV BLD AUTO: 10 FL (ref 7–12)
POTASSIUM SERPL-SCNC: 3.8 MMOL/L (ref 3.5–5)
RBC # BLD: 4.61 E12/L (ref 3.8–5.8)
SODIUM BLD-SCNC: 136 MMOL/L (ref 132–146)
TOTAL PROTEIN: 7.3 G/DL (ref 6.4–8.3)
WBC # BLD: 5.5 E9/L (ref 4.5–11.5)

## 2021-01-15 LAB
HAV IGM SER IA-ACNC: NORMAL
HEPATITIS B CORE IGM ANTIBODY: NORMAL
HEPATITIS B SURFACE ANTIGEN INTERPRETATION: NORMAL
HEPATITIS C ANTIBODY INTERPRETATION: NORMAL

## 2021-04-23 LAB
ALBUMIN SERPL-MCNC: NORMAL G/DL
ALP BLD-CCNC: NORMAL U/L
ALT SERPL-CCNC: 44 U/L
ANION GAP SERPL CALCULATED.3IONS-SCNC: NORMAL MMOL/L
AST SERPL-CCNC: 33 U/L
AVERAGE GLUCOSE: NORMAL
BILIRUB SERPL-MCNC: NORMAL MG/DL
BUN BLDV-MCNC: NORMAL MG/DL
CALCIUM SERPL-MCNC: 9.4 MG/DL
CHLORIDE BLD-SCNC: NORMAL MMOL/L
CHOLESTEROL, TOTAL: 109 MG/DL
CHOLESTEROL/HDL RATIO: 1.9
CO2: NORMAL
CREAT SERPL-MCNC: 0.81 MG/DL
CREATININE, URINE: 85
GFR CALCULATED: 106
GLUCOSE BLD-MCNC: NORMAL MG/DL
HBA1C MFR BLD: 6.4 %
HDLC SERPL-MCNC: 58 MG/DL (ref 35–70)
LDL CHOLESTEROL CALCULATED: 34 MG/DL (ref 0–160)
MICROALBUMIN/CREAT 24H UR: 0.2 MG/G{CREAT}
MICROALBUMIN/CREAT UR-RTO: 2
NONHDLC SERPL-MCNC: NORMAL MG/DL
POTASSIUM SERPL-SCNC: 4.1 MMOL/L
SODIUM BLD-SCNC: 138 MMOL/L
TOTAL PROTEIN: NORMAL
TRIGL SERPL-MCNC: 91 MG/DL
VITAMIN D 25-HYDROXY: 26
VITAMIN D2, 25 HYDROXY: NORMAL
VITAMIN D3,25 HYDROXY: NORMAL
VLDLC SERPL CALC-MCNC: NORMAL MG/DL

## 2021-07-12 DIAGNOSIS — E55.9 VITAMIN D DEFICIENCY: ICD-10-CM

## 2021-07-12 DIAGNOSIS — E11.9 TYPE 2 DIABETES MELLITUS WITHOUT COMPLICATION, UNSPECIFIED WHETHER LONG TERM INSULIN USE (HCC): ICD-10-CM

## 2021-07-12 DIAGNOSIS — E78.5 HYPERLIPIDEMIA, UNSPECIFIED HYPERLIPIDEMIA TYPE: ICD-10-CM

## 2021-07-20 ENCOUNTER — HOSPITAL ENCOUNTER (OUTPATIENT)
Age: 46
Discharge: HOME | End: 2021-07-20
Payer: COMMERCIAL

## 2021-07-20 PROCEDURE — 86481 TB AG RESPONSE T-CELL SUSP: CPT

## 2021-07-20 PROCEDURE — 36415 COLL VENOUS BLD VENIPUNCTURE: CPT

## 2021-08-02 LAB
COMMENT: NORMAL
REPORT: NORMAL

## 2021-10-19 DIAGNOSIS — E11.9 TYPE 2 DIABETES MELLITUS WITHOUT COMPLICATION, UNSPECIFIED WHETHER LONG TERM INSULIN USE (HCC): ICD-10-CM

## 2021-10-20 RX ORDER — METFORMIN HYDROCHLORIDE 500 MG/1
1000 TABLET, EXTENDED RELEASE ORAL 2 TIMES DAILY
Qty: 120 TABLET | Refills: 5 | Status: SHIPPED
Start: 2021-10-20 | End: 2022-04-19

## 2021-10-20 RX ORDER — GLIPIZIDE 5 MG/1
5 TABLET, FILM COATED, EXTENDED RELEASE ORAL 2 TIMES DAILY
Qty: 180 TABLET | Refills: 5 | Status: SHIPPED
Start: 2021-10-20 | End: 2022-06-21 | Stop reason: DRUGHIGH

## 2022-04-19 DIAGNOSIS — E11.9 TYPE 2 DIABETES MELLITUS WITHOUT COMPLICATION, UNSPECIFIED WHETHER LONG TERM INSULIN USE (HCC): ICD-10-CM

## 2022-04-19 RX ORDER — METFORMIN HYDROCHLORIDE 500 MG/1
TABLET, EXTENDED RELEASE ORAL
Qty: 120 TABLET | Refills: 3 | Status: SHIPPED
Start: 2022-04-19 | End: 2022-08-23

## 2022-06-13 ENCOUNTER — TELEPHONE (OUTPATIENT)
Dept: ADMINISTRATIVE | Age: 47
End: 2022-06-13

## 2022-06-13 NOTE — TELEPHONE ENCOUNTER
Offered patient two openings but couldn't do either.  Patient will call back to see if openings before deadline for paperwork

## 2022-06-13 NOTE — TELEPHONE ENCOUNTER
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 Offered appt on 6/15, pt unable to come on that day. No other availability. Please contact pt's wife.

## 2022-06-15 ENCOUNTER — OFFICE VISIT (OUTPATIENT)
Dept: ENDOCRINOLOGY | Age: 47
End: 2022-06-15
Payer: COMMERCIAL

## 2022-06-15 VITALS
DIASTOLIC BLOOD PRESSURE: 89 MMHG | SYSTOLIC BLOOD PRESSURE: 135 MMHG | HEART RATE: 101 BPM | HEIGHT: 69 IN | BODY MASS INDEX: 28.58 KG/M2 | WEIGHT: 193 LBS | OXYGEN SATURATION: 99 %

## 2022-06-15 DIAGNOSIS — E78.5 HYPERLIPIDEMIA, UNSPECIFIED HYPERLIPIDEMIA TYPE: ICD-10-CM

## 2022-06-15 DIAGNOSIS — E11.9 TYPE 2 DIABETES MELLITUS WITHOUT COMPLICATION, WITHOUT LONG-TERM CURRENT USE OF INSULIN (HCC): ICD-10-CM

## 2022-06-15 DIAGNOSIS — Z91.119 DIETARY NONCOMPLIANCE: ICD-10-CM

## 2022-06-15 DIAGNOSIS — E11.9 TYPE 2 DIABETES MELLITUS WITHOUT COMPLICATION, WITHOUT LONG-TERM CURRENT USE OF INSULIN (HCC): Primary | ICD-10-CM

## 2022-06-15 DIAGNOSIS — E55.9 VITAMIN D DEFICIENCY: ICD-10-CM

## 2022-06-15 LAB
ANION GAP SERPL CALCULATED.3IONS-SCNC: 17 MMOL/L (ref 7–16)
BUN BLDV-MCNC: 10 MG/DL (ref 6–20)
CALCIUM SERPL-MCNC: 9.4 MG/DL (ref 8.6–10.2)
CHLORIDE BLD-SCNC: 97 MMOL/L (ref 98–107)
CHOLESTEROL, TOTAL: 160 MG/DL (ref 0–199)
CO2: 24 MMOL/L (ref 22–29)
CREAT SERPL-MCNC: 0.6 MG/DL (ref 0.7–1.2)
CREATININE URINE: 104 MG/DL (ref 40–278)
GFR AFRICAN AMERICAN: >60
GFR NON-AFRICAN AMERICAN: >60 ML/MIN/1.73
GLUCOSE BLD-MCNC: 133 MG/DL (ref 74–99)
HBA1C MFR BLD: 8.3 %
HCT VFR BLD CALC: 46.8 % (ref 37–54)
HDLC SERPL-MCNC: 48 MG/DL
HEMOGLOBIN: 16.1 G/DL (ref 12.5–16.5)
LDL CHOLESTEROL CALCULATED: 75 MG/DL (ref 0–99)
MCH RBC QN AUTO: 32.1 PG (ref 26–35)
MCHC RBC AUTO-ENTMCNC: 34.4 % (ref 32–34.5)
MCV RBC AUTO: 93.2 FL (ref 80–99.9)
MICROALBUMIN UR-MCNC: 83.9 MG/L
MICROALBUMIN/CREAT UR-RTO: 80.7 (ref 0–30)
PDW BLD-RTO: 12.5 FL (ref 11.5–15)
PLATELET # BLD: 251 E9/L (ref 130–450)
PMV BLD AUTO: 10.3 FL (ref 7–12)
POTASSIUM SERPL-SCNC: 4.4 MMOL/L (ref 3.5–5)
RBC # BLD: 5.02 E12/L (ref 3.8–5.8)
SODIUM BLD-SCNC: 138 MMOL/L (ref 132–146)
TRIGL SERPL-MCNC: 187 MG/DL (ref 0–149)
VITAMIN D 25-HYDROXY: 42 NG/ML (ref 30–100)
VLDLC SERPL CALC-MCNC: 37 MG/DL
WBC # BLD: 8.9 E9/L (ref 4.5–11.5)

## 2022-06-15 PROCEDURE — 83036 HEMOGLOBIN GLYCOSYLATED A1C: CPT | Performed by: NURSE PRACTITIONER

## 2022-06-15 PROCEDURE — 99214 OFFICE O/P EST MOD 30 MIN: CPT | Performed by: NURSE PRACTITIONER

## 2022-06-15 PROCEDURE — 3052F HG A1C>EQUAL 8.0%<EQUAL 9.0%: CPT | Performed by: NURSE PRACTITIONER

## 2022-06-15 RX ORDER — GLIPIZIDE 5 MG/1
TABLET, FILM COATED, EXTENDED RELEASE ORAL
Qty: 60 TABLET | Refills: 3 | Status: SHIPPED
Start: 2022-06-15 | End: 2022-06-21 | Stop reason: DRUGHIGH

## 2022-06-15 NOTE — PROGRESS NOTES
[address] [facility] Department of Endocrinology Diabetes and Metabolism   [address] [address]   Phone: [phone]  Fax: [phone]    Date of Service: 6/15/2022    Primary Care Physician: Joyce Salcido MD  Referring physician: No ref. provider found  Provider: STACY Osei NP      Reason for the visit:  DM type 2     History of Present Illness: The history is provided by the patient. No  was used. Accuracy of the patient data is excellent. Ernesto Puente is a very pleasant 50 y.o. male seen today for diabetes management     Ernesto Puente was diagnosed with diabetes in 10/2020, at jenna time he was admitted to hospital with     The patient is currently on Metformin  mg 2 tab BID and Glipizide Er 5 mg BID    The patient has been checking blood sugar BID  He did not bring his meter today  Per recall 160-170, pre dinner 130-140    Ac 8.3% today in OV    Most recent A1c results summarized below  Lab Results   Component Value Date    LABA1C 8.3 06/15/2022    LABA1C 6.4 04/23/2021    LABA1C 8.2 12/30/2020     Patient has had no hypoglycemic episodes. Has awareness at [facility]  The patient has been mindful of what has been eating and following diabetic diet as encouraged  Made significant diet changes in the past 2 weeks   I reviewed current medications and the patient has no issues with diabetes medications  The patient is up to date for eye exam. No h/o diabetic retinopathy  The patient performs his own feet care  Microvascular complications:  No Retinopathy, Nephropathy or Neuropathy   Macrovascular complications: no CAD, PVD, or Stroke    No HX of pancreatitis  No Hx of MTC  No HX of gastroparesis   No HX of UTI/Mycotic infection     PAST MEDICAL HISTORY   Past Medical History:   Diagnosis Date    Psoriasis        PAST SURGICAL HISTORY   No past surgical history on file.     SOCIAL HISTORY   Tobacco:   reports that he has never smoked. He has never used smokeless tobacco.  Alcohol:   reports current alcohol use. Drugs:   reports no history of drug use. FAMILY HISTORY   No family history on file. ALLERGIES AND DRUG REACTIONS   Allergies   Allergen Reactions    Blue Dyes (Parenteral) Hives, Itching and Rash       CURRENT MEDICATIONS   Current Outpatient Medications   Medication Sig Dispense Refill    glipiZIDE (GLUCOTROL XL) 5 MG extended release tablet Take 2 tablets BID 60 tablet 3    metFORMIN (GLUCOPHAGE-XR) 500 MG extended release tablet TAKE TWO TABLETS BY MOUTH TWO TIMES A  tablet 3    glipiZIDE (GLUCOTROL XL) 5 MG extended release tablet Take 1 tablet by mouth 2 times daily 180 tablet 5    Lancets 30G MISC 1 each by Does not apply route daily 100 each 3    blood glucose monitor strips Test 3 times a day & as needed for symptoms of irregular blood glucose. Dispense sufficient amount for indicated testing frequency plus additional to accommodate PRN testing needs. 100 strip 2    omega-3 acid ethyl esters (LOVAZA) 1 g capsule Take 2 capsules by mouth 2 times daily 60 capsule 3    fenofibrate (TRICOR) 145 MG tablet Take 1 tablet by mouth daily 30 tablet 3    ixekizumab (TALTZ) 80 MG/ML SOAJ prefilled syringe Inject 80 mg into the skin once       No current facility-administered medications for this visit. Review of Systems  Constitutional: No fever, no chills, no diaphoresis, no generalized weakness. HEENT: No blurred vision, No sore throat, no ear pain, no hair loss  Neck: denied any neck swelling, difficulty swallowing,   Cardio-pulmonary: No CP, SOB or palpitation, No orthopnea or PND. No cough or wheezing. GI: No N/V/D, no constipation, No abdominal pain, no melena or hematochezia   : Denied any dysuria, hematuria, flank pain, discharge, or incontinence. Skin: denied any rash, ulcer, Hirsute, or hyperpigmentation.    MSK: denied any joint deformity, joint pain/swelling, muscle pain, or back pain.  Neuro: no numbness, no tingling, no weakness    OBJECTIVE    /89   Pulse (!) 101   Ht 5' 9\" (1.753 m)   Wt 193 lb (87.5 kg)   SpO2 99%   BMI 28.50 kg/m²   BP Readings from Last 4 Encounters:   06/15/22 135/89   11/13/20 124/80   11/04/20 100/62   11/02/20 110/72     Wt Readings from Last 6 Encounters:   06/15/22 193 lb (87.5 kg)   11/13/20 164 lb 12.8 oz (74.8 kg)   11/02/20 149 lb (67.6 kg)   11/02/20 149 lb (67.6 kg)     Physical examination:        Review of Laboratory Data:  I personally reviewed the following lab:  Lab Results   Component Value Date/Time    WBC 5.5 01/14/2021 04:00 PM    RBC 4.61 01/14/2021 04:00 PM    HGB 15.0 01/14/2021 04:00 PM    HCT 42.8 01/14/2021 04:00 PM    MCV 92.8 01/14/2021 04:00 PM    MCH 32.5 01/14/2021 04:00 PM    MCHC 35.0 (H) 01/14/2021 04:00 PM    RDW 11.8 01/14/2021 04:00 PM     01/14/2021 04:00 PM    MPV 10.0 01/14/2021 04:00 PM      Lab Results   Component Value Date/Time     04/23/2021 12:00 AM    K 4.1 04/23/2021 12:00 AM    K 4.7 11/02/2020 07:55 PM    CO2 25 01/14/2021 04:00 PM    BUN 13 01/14/2021 04:00 PM    CREATININE 0.81 04/23/2021 12:00 AM    CALCIUM 9.4 04/23/2021 12:00 AM    LABGLOM 106 04/23/2021 12:00 AM    LABGLOM >60 01/14/2021 04:00 PM    GFRAA >60 01/14/2021 04:00 PM      Lab Results   Component Value Date/Time    TSH 1.090 11/03/2020 03:31 AM    T4FREE 0.77 (L) 11/03/2020 03:31 AM     Lab Results   Component Value Date    LABA1C 8.3 06/15/2022    GLUCOSE 151 01/14/2021    MALBCR 2 04/23/2021    LABCREA 85 04/23/2021     Lab Results   Component Value Date    LABA1C 8.3 06/15/2022    LABA1C 6.4 04/23/2021    LABA1C 8.2 12/30/2020     Lab Results   Component Value Date    TRIG 91 04/23/2021    HDL 58 04/23/2021    LDLCALC 34 04/23/2021    CHOL 109 04/23/2021     Lab Results   Component Value Date    VITD25 26 04/23/2021       Medical Records/Labs/Images review:   I personally reviewed and summarized previous records   All labs and imaging studies were independently reviewed     ASSESSMENT & RECOMMENDATIONS   Katherine Parra, a 50 y.o.-old male seen in for the following issues     Newly diagnosed Diabetes Mellitus Type 2   · Patient DM with worsening control  · Will change DM regimen to Metformin  mg 2 tab BID, Glipizide ER 10 mg BID  · The patient was advised to check blood sugars 2-3 times a day before meals and at bedtime and send BS readings to our office in 2 weeks  · If BS not improved can add GLP-I or  SGLT-2, no contraindications  · Discussed with patient A1c and blood sugar goals   · Patient will need routine diabetes maintenance and prevention  · Diabetes labs before next visit     Dietary noncompliance   Improving   Discussed with patient the importance of eating consistent carbohydrate meals, avoiding high glycemic index food. Also, discussed with patient the risk and negative consequences of dietary noncompliance on blood glucose control, blood pressure and weight    HLD  · Previously was on statin therapy - stopped on his own  · Will recheck levels     Vitamin D deficiency  · Will check levels  · Not on replacement therapy    Return in about 3 months (around 9/15/2022) for type 2 DM. The above issues were reviewed with the patient who understood and agreed with the plan. Thank you for allowing us to participate in the care of this patient. Please do not hesitate to contact us with any additional questions. Diagnosis Orders   1. Type 2 diabetes mellitus without complication, without long-term current use of insulin (HCC)  POCT glycosylated hemoglobin (Hb A1C)    Basic Metabolic Panel    CBC    MICROALBUMIN / CREATININE URINE RATIO    glipiZIDE (GLUCOTROL XL) 5 MG extended release tablet   2. Dietary noncompliance     3.  Hyperlipidemia, unspecified hyperlipidemia type  LIPID PANEL   4. Vitamin D deficiency  Vitamin D 25 Hydroxy     STACY [place] - BARBARA    [facility]   [address], [address] [place] [address],[address] [address]   Phone: [phone]  Fax: [phone]  ---------------------  An electronic signature was used to authenticate this note.  Dasia Gonzalez, STACY - NP on 6/15/2022 at 12:53 PM

## 2022-06-16 ENCOUNTER — TELEPHONE (OUTPATIENT)
Dept: ENDOCRINOLOGY | Age: 47
End: 2022-06-16

## 2022-06-16 NOTE — TELEPHONE ENCOUNTER
----- Message from STACY Teresa NP sent at 6/16/2022  9:10 AM EDT -----  Please call pt and inform him that I have reviewed his labs.  Vit D is WNL, Protein is noted in the urine (goal less than 30) his result is 80.7, will work on lowering BS to decrease protein, if not improved can add lisinopril 2.5mg for renal protecti  on, TRG are slightly elevated at 187, decrease saturated fats in diet

## 2022-06-21 DIAGNOSIS — E11.9 TYPE 2 DIABETES MELLITUS WITHOUT COMPLICATION, WITHOUT LONG-TERM CURRENT USE OF INSULIN (HCC): Primary | ICD-10-CM

## 2022-06-21 RX ORDER — GLIPIZIDE 10 MG/1
10 TABLET, FILM COATED, EXTENDED RELEASE ORAL 2 TIMES DAILY
Qty: 180 TABLET | Refills: 3 | Status: SHIPPED | OUTPATIENT
Start: 2022-06-21

## 2022-08-22 DIAGNOSIS — E11.9 TYPE 2 DIABETES MELLITUS WITHOUT COMPLICATION, UNSPECIFIED WHETHER LONG TERM INSULIN USE (HCC): ICD-10-CM

## 2022-08-23 RX ORDER — METFORMIN HYDROCHLORIDE 500 MG/1
TABLET, EXTENDED RELEASE ORAL
Qty: 360 TABLET | Refills: 1 | Status: SHIPPED | OUTPATIENT
Start: 2022-08-23

## 2022-10-25 ENCOUNTER — OFFICE VISIT (OUTPATIENT)
Dept: ENDOCRINOLOGY | Age: 47
End: 2022-10-25
Payer: COMMERCIAL

## 2022-10-25 VITALS
HEART RATE: 77 BPM | BODY MASS INDEX: 27.7 KG/M2 | HEIGHT: 69 IN | DIASTOLIC BLOOD PRESSURE: 85 MMHG | WEIGHT: 187 LBS | SYSTOLIC BLOOD PRESSURE: 138 MMHG

## 2022-10-25 DIAGNOSIS — E11.9 TYPE 2 DIABETES MELLITUS WITHOUT COMPLICATION, UNSPECIFIED WHETHER LONG TERM INSULIN USE (HCC): Primary | ICD-10-CM

## 2022-10-25 DIAGNOSIS — E78.2 MIXED HYPERLIPIDEMIA: ICD-10-CM

## 2022-10-25 DIAGNOSIS — E55.9 VITAMIN D DEFICIENCY: ICD-10-CM

## 2022-10-25 LAB — HBA1C MFR BLD: 7.8 %

## 2022-10-25 PROCEDURE — 83036 HEMOGLOBIN GLYCOSYLATED A1C: CPT | Performed by: NURSE PRACTITIONER

## 2022-10-25 PROCEDURE — G8484 FLU IMMUNIZE NO ADMIN: HCPCS | Performed by: NURSE PRACTITIONER

## 2022-10-25 PROCEDURE — G8419 CALC BMI OUT NRM PARAM NOF/U: HCPCS | Performed by: NURSE PRACTITIONER

## 2022-10-25 PROCEDURE — 99214 OFFICE O/P EST MOD 30 MIN: CPT | Performed by: NURSE PRACTITIONER

## 2022-10-25 PROCEDURE — 1036F TOBACCO NON-USER: CPT | Performed by: NURSE PRACTITIONER

## 2022-10-25 PROCEDURE — G8427 DOCREV CUR MEDS BY ELIG CLIN: HCPCS | Performed by: NURSE PRACTITIONER

## 2022-10-25 PROCEDURE — 3051F HG A1C>EQUAL 7.0%<8.0%: CPT | Performed by: NURSE PRACTITIONER

## 2022-10-25 PROCEDURE — 2022F DILAT RTA XM EVC RTNOPTHY: CPT | Performed by: NURSE PRACTITIONER

## 2022-10-25 RX ORDER — ATORVASTATIN CALCIUM 20 MG/1
20 TABLET, FILM COATED ORAL DAILY
Qty: 30 TABLET | Refills: 3 | Status: SHIPPED | OUTPATIENT
Start: 2022-10-25

## 2022-10-25 NOTE — PROGRESS NOTES
[address] [place] [address] Department of Endocrinology Diabetes and Metabolism   [address] [facility] [address]   Phone: [phone]  Fax: [phone]    Date of Service: 10/25/2022    Primary Care Physician: Christina Myles MD  Referring physician: No ref. provider found  Provider: [address] [place] [address], APRN - NP      Reason for the visit:  DM type 2     History of Present Illness: The history is provided by the patient. No  was used. Accuracy of the patient data is excellent. Edith Morillo is a very pleasant 50 y.o. male seen today for diabetes management     Edith Morillo was diagnosed with diabetes in 10/2020, at jenna time he was admitted to hospital with     The patient is currently on Metformin  mg 2 tab BID and Glipizide Er 10 mg BID    The patient has been checking blood sugar BID  He did not bring his meter today  Per recall 140, pre dinner 160-170    Most recent A1c results summarized below  Lab Results   Component Value Date/Time    LABA1C 7.8 10/25/2022 07:41 AM    LABA1C 8.3 06/15/2022 12:35 PM    LABA1C 6.4 04/23/2021 12:00 AM     Patient has had no hypoglycemic episodes. Has awareness at [facility] 100  The patient has been mindful of what has been eating and following diabetic diet as encouraged  I reviewed current medications and the patient has no issues with diabetes medications  The patient is up to date for eye exam. No h/o diabetic retinopathy  The patient performs his own feet care  Microvascular complications:  No Retinopathy, Nephropathy or Neuropathy   Macrovascular complications: no CAD, PVD, or Stroke    No HX of pancreatitis  No Hx of MTC  No HX of gastroparesis   No HX of UTI/Mycotic infection     PAST MEDICAL HISTORY   Past Medical History:   Diagnosis Date    Psoriasis        PAST SURGICAL HISTORY   No past surgical history on file. SOCIAL HISTORY   Tobacco:   reports that he has never smoked.  He has never used smokeless tobacco.  Alcohol:   reports current alcohol use. Drugs:   reports no history of drug use. FAMILY HISTORY   No family history on file. ALLERGIES AND DRUG REACTIONS   Allergies   Allergen Reactions    Blue Dyes (Parenteral) Hives, Itching and Rash       CURRENT MEDICATIONS   Current Outpatient Medications   Medication Sig Dispense Refill    atorvastatin (LIPITOR) 20 MG tablet Take 1 tablet by mouth daily 30 tablet 3    metFORMIN (GLUCOPHAGE-XR) 500 MG extended release tablet TAKE TWO TABLETS BY MOUTH TWO TIMES A  tablet 1    glipiZIDE (GLUCOTROL XL) 10 MG extended release tablet Take 1 tablet by mouth in the morning and at bedtime 180 tablet 3    Lancets 30G MISC 1 each by Does not apply route daily 100 each 3    blood glucose monitor strips Test 3 times a day & as needed for symptoms of irregular blood glucose. Dispense sufficient amount for indicated testing frequency plus additional to accommodate PRN testing needs. 100 strip 2    empagliflozin (JARDIANCE) 10 MG tablet Take 1 tablet by mouth daily 30 tablet 3    ixekizumab (TALTZ) 80 MG/ML SOAJ prefilled syringe Inject 80 mg into the skin once       No current facility-administered medications for this visit. Review of Systems  Constitutional: No fever, no chills, no diaphoresis, no generalized weakness. HEENT: No blurred vision, No sore throat, no ear pain, no hair loss  Neck: denied any neck swelling, difficulty swallowing,   Cardio-pulmonary: No CP, SOB or palpitation, No orthopnea or PND. No cough or wheezing. GI: No N/V/D, no constipation, No abdominal pain, no melena or hematochezia   : Denied any dysuria, hematuria, flank pain, discharge, or incontinence. Skin: denied any rash, ulcer, Hirsute, or hyperpigmentation. MSK: denied any joint deformity, joint pain/swelling, muscle pain, or back pain.   Neuro: no numbness, no tingling, no weakness    OBJECTIVE    /85   Pulse 77   Ht 5' 9\" (1.753 m)   Wt 187 lb (84.8 kg)   BMI 27.62 kg/m²   BP Readings from Last 4 Encounters:   10/25/22 138/85   06/15/22 135/89   11/13/20 124/80   11/04/20 100/62     Wt Readings from Last 6 Encounters:   10/25/22 187 lb (84.8 kg)   06/15/22 193 lb (87.5 kg)   11/13/20 164 lb 12.8 oz (74.8 kg)   11/02/20 149 lb (67.6 kg)   11/02/20 149 lb (67.6 kg)     Physical examination:        Review of Laboratory Data:  I personally reviewed the following lab:  Lab Results   Component Value Date/Time    WBC 8.9 06/15/2022 01:09 PM    RBC 5.02 06/15/2022 01:09 PM    HGB 16.1 06/15/2022 01:09 PM    HCT 46.8 06/15/2022 01:09 PM    MCV 93.2 06/15/2022 01:09 PM    MCH 32.1 06/15/2022 01:09 PM    MCHC 34.4 06/15/2022 01:09 PM    RDW 12.5 06/15/2022 01:09 PM     06/15/2022 01:09 PM    MPV 10.3 06/15/2022 01:09 PM      Lab Results   Component Value Date/Time     06/15/2022 01:09 PM    K 4.4 06/15/2022 01:09 PM    K 4.7 11/02/2020 07:55 PM    CO2 24 06/15/2022 01:09 PM    BUN 10 06/15/2022 01:09 PM    CREATININE 0.6 (L) 06/15/2022 01:09 PM    CALCIUM 9.4 06/15/2022 01:09 PM    LABGLOM >60 06/15/2022 01:09 PM    GFRAA >60 06/15/2022 01:09 PM      Lab Results   Component Value Date/Time    TSH 1.090 11/03/2020 03:31 AM    T4FREE 0.77 (L) 11/03/2020 03:31 AM     Lab Results   Component Value Date/Time    LABA1C 7.8 10/25/2022 07:41 AM    GLUCOSE 133 06/15/2022 01:09 PM    MALBCR 80.7 06/15/2022 01:09 PM    LABMICR 83.9 06/15/2022 01:09 PM    LABCREA 104 06/15/2022 01:09 PM     Lab Results   Component Value Date/Time    LABA1C 7.8 10/25/2022 07:41 AM    LABA1C 8.3 06/15/2022 12:35 PM    LABA1C 6.4 04/23/2021 12:00 AM     Lab Results   Component Value Date/Time    TRIG 187 06/15/2022 01:09 PM    HDL 48 06/15/2022 01:09 PM    LDLCALC 75 06/15/2022 01:09 PM    CHOL 160 06/15/2022 01:09 PM     Lab Results   Component Value Date/Time    VITD25 42 06/15/2022 01:09 PM    VITD25 26 04/23/2021 12:00 AM       Medical Records/Labs/Images review:   I personally reviewed and summarized previous records   All labs and imaging studies were independently reviewed     ASSESSMENT & RECOMMENDATIONS   Lennette Ahumada, a 50 y.o.-old male seen in for the following issues     Newly diagnosed Diabetes Mellitus Type 2   Patient DM with improving control 7.8%  Will change DM regimen to Metformin  mg 2 tab BID, Glipizide ER 10 mg BID  Will add jardiance 10mg daily - no contraindications, side affects discussed   The patient was advised to check blood sugars 2-3 times a day before meals and at bedtime and send BS readings to our office in 2 weeks  Discussed with patient A1c and blood sugar goals   Patient will need routine diabetes maintenance and prevention  Diabetes labs before next visit     Dietary noncompliance  Improving  Discussed with patient the importance of eating consistent carbohydrate meals, avoiding high glycemic index food. Also, discussed with patient the risk and negative consequences of dietary noncompliance on blood glucose control, blood pressure and weight    HLD  Previously was on statin therapy - stopped on his own  Will resume atorvastatin 20mg daily     Vitamin D deficiency  At goal  Not on replacement therapy    Return in about 3 months (around 1/25/2023) for Type 2 DM. The above issues were reviewed with the patient who understood and agreed with the plan. Thank you for allowing us to participate in the care of this patient. Please do not hesitate to contact us with any additional questions. Diagnosis Orders   1. Type 2 diabetes mellitus without complication, unspecified whether long term insulin use (HCC)  POCT glycosylated hemoglobin (Hb A1C)    empagliflozin (JARDIANCE) 10 MG tablet      2. Mixed hyperlipidemia  atorvastatin (LIPITOR) 20 MG tablet      3.  Vitamin D deficiency          Addison So, APRN - NP    [facility] - BEHAVIORAL HE[facility] SERVICES Diabetes Care and Endocrinology   [address] [facility] [address]   Phone: [phone]  Fax: [phone]  ---------------------  An electronic signature was used to authenticate this note.  STACY Matthews - NP on 10/25/2022 at 8:01 AM

## 2022-12-01 ENCOUNTER — TELEPHONE (OUTPATIENT)
Dept: ENDOCRINOLOGY | Age: 47
End: 2022-12-01

## 2023-01-23 ENCOUNTER — OFFICE VISIT (OUTPATIENT)
Dept: ENDOCRINOLOGY | Age: 48
End: 2023-01-23
Payer: COMMERCIAL

## 2023-01-23 VITALS
WEIGHT: 191 LBS | HEART RATE: 80 BPM | HEIGHT: 69 IN | BODY MASS INDEX: 28.29 KG/M2 | DIASTOLIC BLOOD PRESSURE: 80 MMHG | SYSTOLIC BLOOD PRESSURE: 131 MMHG

## 2023-01-23 DIAGNOSIS — E11.9 TYPE 2 DIABETES MELLITUS WITHOUT COMPLICATION, UNSPECIFIED WHETHER LONG TERM INSULIN USE (HCC): Primary | ICD-10-CM

## 2023-01-23 DIAGNOSIS — E78.2 MIXED HYPERLIPIDEMIA: ICD-10-CM

## 2023-01-23 DIAGNOSIS — E55.9 VITAMIN D DEFICIENCY: ICD-10-CM

## 2023-01-23 LAB
CHOLESTEROL, TOTAL: 179 MG/DL (ref 0–199)
HBA1C MFR BLD: 6.2 %
HDLC SERPL-MCNC: 56 MG/DL
LDL CHOLESTEROL CALCULATED: 93 MG/DL (ref 0–99)
TRIGL SERPL-MCNC: 152 MG/DL (ref 0–149)
VLDLC SERPL CALC-MCNC: 30 MG/DL

## 2023-01-23 PROCEDURE — G8427 DOCREV CUR MEDS BY ELIG CLIN: HCPCS | Performed by: NURSE PRACTITIONER

## 2023-01-23 PROCEDURE — G8484 FLU IMMUNIZE NO ADMIN: HCPCS | Performed by: NURSE PRACTITIONER

## 2023-01-23 PROCEDURE — 1036F TOBACCO NON-USER: CPT | Performed by: NURSE PRACTITIONER

## 2023-01-23 PROCEDURE — 99214 OFFICE O/P EST MOD 30 MIN: CPT | Performed by: NURSE PRACTITIONER

## 2023-01-23 PROCEDURE — 83036 HEMOGLOBIN GLYCOSYLATED A1C: CPT | Performed by: NURSE PRACTITIONER

## 2023-01-23 PROCEDURE — 3044F HG A1C LEVEL LT 7.0%: CPT | Performed by: NURSE PRACTITIONER

## 2023-01-23 PROCEDURE — G8419 CALC BMI OUT NRM PARAM NOF/U: HCPCS | Performed by: NURSE PRACTITIONER

## 2023-01-23 PROCEDURE — 2022F DILAT RTA XM EVC RTNOPTHY: CPT | Performed by: NURSE PRACTITIONER

## 2023-01-23 NOTE — PROGRESS NOTES
[address] [facility] Department of Endocrinology Diabetes and Metabolism   [address] [facility] [address]   Phone: [phone]  Fax: [phone]    Date of Service: 1/23/2023    Primary Care Physician: Ame Cobb MD  Referring physician: No ref. provider found  Provider: STACY Rodriguez NP      Reason for the visit:  DM type 2     History of Present Illness: The history is provided by the patient. No  was used. Accuracy of the patient data is excellent. Deja Morrow is a very pleasant 50 y.o. male seen today for diabetes management     Deja Morrow was diagnosed with diabetes in 10/2020, at jenna time he was admitted to hospital with     The patient is currently on Metformin  mg 2 tab BID and Glipizide Er 10 mg BID, jardiance 10 mg daily    The patient has been checking blood sugar BID    Per recall 125-130, pre dinner 85-95    Most recent A1c results summarized below  Lab Results   Component Value Date/Time    LABA1C 6.2 01/23/2023 08:05 AM    LABA1C 7.8 10/25/2022 07:41 AM    LABA1C 8.3 06/15/2022 12:35 PM     Patient has had hypoglycemic episodes 60-70's during the day with not eating while at work. Has awareness at [facility] 100  The patient has been mindful of what has been eating and following diabetic diet as encouraged  I reviewed current medications and the patient has no issues with diabetes medications  The patient is up to date for eye exam. No h/o diabetic retinopathy  The patient performs his own feet care  Microvascular complications:  No Retinopathy, Nephropathy or Neuropathy   Macrovascular complications: no CAD, PVD, or Stroke    No HX of pancreatitis  No Hx of MTC  No HX of gastroparesis   No HX of UTI/Mycotic infection     PAST MEDICAL HISTORY   Past Medical History:   Diagnosis Date    Psoriasis        PAST SURGICAL HISTORY   No past surgical history on file.     SOCIAL HISTORY   Tobacco:   reports that he has never smoked. He has never used smokeless tobacco.  Alcohol:   reports current alcohol use. Drugs:   reports no history of drug use. FAMILY HISTORY   No family history on file. ALLERGIES AND DRUG REACTIONS   Allergies   Allergen Reactions    Blue Dyes (Parenteral) Hives, Itching and Rash       CURRENT MEDICATIONS   Current Outpatient Medications   Medication Sig Dispense Refill    empagliflozin (JARDIANCE) 10 MG tablet Take 1 tablet by mouth daily 30 tablet 3    metFORMIN (GLUCOPHAGE-XR) 500 MG extended release tablet TAKE TWO TABLETS BY MOUTH TWO TIMES A  tablet 1    glipiZIDE (GLUCOTROL XL) 10 MG extended release tablet Take 1 tablet by mouth in the morning and at bedtime 180 tablet 3    Lancets 30G MISC 1 each by Does not apply route daily 100 each 3    atorvastatin (LIPITOR) 20 MG tablet Take 1 tablet by mouth daily 30 tablet 3    blood glucose monitor strips Test 3 times a day & as needed for symptoms of irregular blood glucose. Dispense sufficient amount for indicated testing frequency plus additional to accommodate PRN testing needs. 100 strip 2    ixekizumab (TALTZ) 80 MG/ML SOAJ prefilled syringe Inject 80 mg into the skin once       No current facility-administered medications for this visit. Review of Systems  Constitutional: No fever, no chills, no diaphoresis, no generalized weakness. HEENT: No blurred vision, No sore throat, no ear pain, no hair loss  Neck: denied any neck swelling, difficulty swallowing,   Cardio-pulmonary: No CP, SOB or palpitation, No orthopnea or PND. No cough or wheezing. GI: No N/V/D, no constipation, No abdominal pain, no melena or hematochezia   : Denied any dysuria, hematuria, flank pain, discharge, or incontinence. Skin: denied any rash, ulcer, Hirsute, or hyperpigmentation. MSK: denied any joint deformity, joint pain/swelling, muscle pain, or back pain.   Neuro: no numbness, no tingling, no weakness    OBJECTIVE    /80   Pulse 80   Ht 5' 9\" (1.753 m)   Wt 191 lb (86.6 kg)   BMI 28.21 kg/m²   BP Readings from Last 4 Encounters:   01/23/23 131/80   10/25/22 138/85   06/15/22 135/89   11/13/20 124/80     Wt Readings from Last 6 Encounters:   01/23/23 191 lb (86.6 kg)   10/25/22 187 lb (84.8 kg)   06/15/22 193 lb (87.5 kg)   11/13/20 164 lb 12.8 oz (74.8 kg)   11/02/20 149 lb (67.6 kg)   11/02/20 149 lb (67.6 kg)     Physical examination:        Review of Laboratory Data:  I personally reviewed the following lab:  Lab Results   Component Value Date/Time    WBC 8.9 06/15/2022 01:09 PM    RBC 5.02 06/15/2022 01:09 PM    HGB 16.1 06/15/2022 01:09 PM    HCT 46.8 06/15/2022 01:09 PM    MCV 93.2 06/15/2022 01:09 PM    MCH 32.1 06/15/2022 01:09 PM    MCHC 34.4 06/15/2022 01:09 PM    RDW 12.5 06/15/2022 01:09 PM     06/15/2022 01:09 PM    MPV 10.3 06/15/2022 01:09 PM      Lab Results   Component Value Date/Time     06/15/2022 01:09 PM    K 4.4 06/15/2022 01:09 PM    K 4.7 11/02/2020 07:55 PM    CO2 24 06/15/2022 01:09 PM    BUN 10 06/15/2022 01:09 PM    CREATININE 0.6 (L) 06/15/2022 01:09 PM    CALCIUM 9.4 06/15/2022 01:09 PM    LABGLOM >60 06/15/2022 01:09 PM    GFRAA >60 06/15/2022 01:09 PM      Lab Results   Component Value Date/Time    TSH 1.090 11/03/2020 03:31 AM    T4FREE 0.77 (L) 11/03/2020 03:31 AM     Lab Results   Component Value Date/Time    LABA1C 6.2 01/23/2023 08:05 AM    GLUCOSE 133 06/15/2022 01:09 PM    MALBCR 80.7 06/15/2022 01:09 PM    LABMICR 83.9 06/15/2022 01:09 PM    LABCREA 104 06/15/2022 01:09 PM     Lab Results   Component Value Date/Time    LABA1C 6.2 01/23/2023 08:05 AM    LABA1C 7.8 10/25/2022 07:41 AM    LABA1C 8.3 06/15/2022 12:35 PM     Lab Results   Component Value Date/Time    TRIG 187 06/15/2022 01:09 PM    HDL 48 06/15/2022 01:09 PM    LDLCALC 75 06/15/2022 01:09 PM    CHOL 160 06/15/2022 01:09 PM     Lab Results   Component Value Date/Time    VITD25 42 06/15/2022 01:09 PM    VITD25 26 04/23/2021 12:00 AM Medical Records/Labs/Images review:   I personally reviewed and summarized previous records   All labs and imaging studies were independently reviewed     ASSESSMENT & RECOMMENDATIONS   Lee Jose Manuel, a 50 y.o.-old male seen in for the following issues     Diabetes Mellitus Type 2   Patient DM with improving control 7.8%- > 6.2%  Will change DM regimen to Metformin  mg 2 tab BID, Glipizide ER 5 mg in AM, 10 mg at dinner  Continue jardiance 10mg daily  The patient was advised to check blood sugars 2 times a day before meals and at bedtime   Discussed with patient A1c and blood sugar goals   Patient will need routine diabetes maintenance and prevention  Diabetes labs before next visit     HLD  Previously was on statin therapy - stopped due to having headaches  Will  check levels    Vitamin D deficiency  At goal  Not on replacement therapy    Return in about 4 months (around 5/23/2023) for Type 2 DM . The above issues were reviewed with the patient who understood and agreed with the plan. Thank you for allowing us to participate in the care of this patient. Please do not hesitate to contact us with any additional questions. Diagnosis Orders   1. Type 2 diabetes mellitus without complication, unspecified whether long term insulin use (HCC)  POCT glycosylated hemoglobin (Hb A1C)      2. Mixed hyperlipidemia  LIPID PANEL      3. Vitamin D deficiency          STACY North NP    [facility] - BEHAVIORAL HEALTH SERVICES Diabetes Care and Endocrinology   [address] [facility] [address]   Phone: [phone]  Fax: [phone]  ---------------------  An electronic signature was used to authenticate this note.  [address], APRN - NP on 1/23/2023 at 8:21 AM

## 2023-01-24 ENCOUNTER — TELEPHONE (OUTPATIENT)
Dept: ENDOCRINOLOGY | Age: 48
End: 2023-01-24

## 2023-01-24 NOTE — TELEPHONE ENCOUNTER
Please call pt and inform him that his CHOL, HDL, AND LDL ARE AT GOAL, TRG  (GOAL 150 OR LESS), I ADVISE EXERCISE AND LOW SAT DIET

## 2023-03-01 DIAGNOSIS — E11.9 TYPE 2 DIABETES MELLITUS WITHOUT COMPLICATION, UNSPECIFIED WHETHER LONG TERM INSULIN USE (HCC): ICD-10-CM

## 2023-03-02 RX ORDER — EMPAGLIFLOZIN 10 MG/1
TABLET, FILM COATED ORAL
Qty: 30 TABLET | Refills: 5 | Status: SHIPPED | OUTPATIENT
Start: 2023-03-02

## 2023-03-07 DIAGNOSIS — E11.9 TYPE 2 DIABETES MELLITUS WITHOUT COMPLICATION, UNSPECIFIED WHETHER LONG TERM INSULIN USE (HCC): ICD-10-CM

## 2023-03-08 RX ORDER — METFORMIN HYDROCHLORIDE 500 MG/1
TABLET, EXTENDED RELEASE ORAL
Qty: 360 TABLET | Refills: 0 | Status: SHIPPED | OUTPATIENT
Start: 2023-03-08

## 2023-03-23 DIAGNOSIS — E11.9 TYPE 2 DIABETES MELLITUS WITHOUT COMPLICATION, UNSPECIFIED WHETHER LONG TERM INSULIN USE (HCC): ICD-10-CM

## 2023-04-17 DIAGNOSIS — E11.9 TYPE 2 DIABETES MELLITUS WITHOUT COMPLICATION, UNSPECIFIED WHETHER LONG TERM INSULIN USE (HCC): ICD-10-CM

## 2023-04-18 RX ORDER — METFORMIN HYDROCHLORIDE 500 MG/1
1000 TABLET, EXTENDED RELEASE ORAL 2 TIMES DAILY
Qty: 360 TABLET | Refills: 1 | Status: SHIPPED | OUTPATIENT
Start: 2023-04-18

## 2023-06-05 DIAGNOSIS — E11.9 TYPE 2 DIABETES MELLITUS WITHOUT COMPLICATION, WITHOUT LONG-TERM CURRENT USE OF INSULIN (HCC): ICD-10-CM

## 2023-06-05 RX ORDER — GLIPIZIDE 10 MG/1
TABLET, FILM COATED, EXTENDED RELEASE ORAL
Qty: 180 TABLET | Refills: 0 | Status: SHIPPED | OUTPATIENT
Start: 2023-06-05

## 2023-06-06 RX ORDER — GLIPIZIDE 10 MG/1
TABLET, FILM COATED, EXTENDED RELEASE ORAL
Qty: 180 TABLET | Refills: 0 | OUTPATIENT
Start: 2023-06-06

## 2023-09-25 DIAGNOSIS — E11.9 TYPE 2 DIABETES MELLITUS WITHOUT COMPLICATION, WITHOUT LONG-TERM CURRENT USE OF INSULIN (HCC): ICD-10-CM

## 2023-10-02 DIAGNOSIS — E11.9 TYPE 2 DIABETES MELLITUS WITHOUT COMPLICATION, WITHOUT LONG-TERM CURRENT USE OF INSULIN (HCC): ICD-10-CM

## 2023-10-03 RX ORDER — GLIPIZIDE 10 MG/1
TABLET, FILM COATED, EXTENDED RELEASE ORAL
Qty: 180 TABLET | Refills: 0 | Status: SHIPPED
Start: 2023-10-03 | End: 2023-10-04 | Stop reason: DRUGHIGH

## 2023-10-03 RX ORDER — GLIPIZIDE 10 MG/1
TABLET, FILM COATED, EXTENDED RELEASE ORAL
Qty: 180 TABLET | Refills: 0 | OUTPATIENT
Start: 2023-10-03

## 2023-10-04 ENCOUNTER — TELEPHONE (OUTPATIENT)
Dept: ENDOCRINOLOGY | Age: 48
End: 2023-10-04

## 2023-10-04 DIAGNOSIS — E11.9 TYPE 2 DIABETES MELLITUS WITHOUT COMPLICATION, WITHOUT LONG-TERM CURRENT USE OF INSULIN (HCC): Primary | ICD-10-CM

## 2023-10-04 RX ORDER — GLIPIZIDE 5 MG/1
5 TABLET, FILM COATED, EXTENDED RELEASE ORAL 2 TIMES DAILY
Qty: 180 TABLET | Refills: 1 | Status: SHIPPED | OUTPATIENT
Start: 2023-10-04

## 2023-10-04 NOTE — TELEPHONE ENCOUNTER
Patient called in for a refill on the glipizide 10 mg I called MINAL murillo sent over a rx today for 5 mg bid I called changed to  5 mg am  10 mg pm per Elias Galarza

## 2023-10-08 DIAGNOSIS — E11.9 TYPE 2 DIABETES MELLITUS WITHOUT COMPLICATION, UNSPECIFIED WHETHER LONG TERM INSULIN USE (HCC): ICD-10-CM

## 2023-10-09 RX ORDER — METFORMIN HYDROCHLORIDE 500 MG/1
1000 TABLET, EXTENDED RELEASE ORAL 2 TIMES DAILY
Qty: 360 TABLET | Refills: 1 | Status: SHIPPED | OUTPATIENT
Start: 2023-10-09

## 2024-01-11 ENCOUNTER — OFFICE VISIT (OUTPATIENT)
Dept: ENDOCRINOLOGY | Age: 49
End: 2024-01-11
Payer: COMMERCIAL

## 2024-01-11 VITALS
HEART RATE: 69 BPM | DIASTOLIC BLOOD PRESSURE: 82 MMHG | SYSTOLIC BLOOD PRESSURE: 145 MMHG | BODY MASS INDEX: 28.47 KG/M2 | WEIGHT: 192.2 LBS | RESPIRATION RATE: 18 BRPM | HEIGHT: 69 IN | OXYGEN SATURATION: 80 %

## 2024-01-11 DIAGNOSIS — E78.2 MIXED HYPERLIPIDEMIA: ICD-10-CM

## 2024-01-11 DIAGNOSIS — E55.9 VITAMIN D DEFICIENCY: ICD-10-CM

## 2024-01-11 DIAGNOSIS — E11.9 TYPE 2 DIABETES MELLITUS WITHOUT COMPLICATION, UNSPECIFIED WHETHER LONG TERM INSULIN USE (HCC): Primary | ICD-10-CM

## 2024-01-11 LAB — HBA1C MFR BLD: 7.1 %

## 2024-01-11 PROCEDURE — G8428 CUR MEDS NOT DOCUMENT: HCPCS | Performed by: NURSE PRACTITIONER

## 2024-01-11 PROCEDURE — 3051F HG A1C>EQUAL 7.0%<8.0%: CPT | Performed by: NURSE PRACTITIONER

## 2024-01-11 PROCEDURE — G8419 CALC BMI OUT NRM PARAM NOF/U: HCPCS | Performed by: NURSE PRACTITIONER

## 2024-01-11 PROCEDURE — 83036 HEMOGLOBIN GLYCOSYLATED A1C: CPT | Performed by: NURSE PRACTITIONER

## 2024-01-11 PROCEDURE — 1036F TOBACCO NON-USER: CPT | Performed by: NURSE PRACTITIONER

## 2024-01-11 PROCEDURE — 99214 OFFICE O/P EST MOD 30 MIN: CPT | Performed by: NURSE PRACTITIONER

## 2024-01-11 PROCEDURE — 2022F DILAT RTA XM EVC RTNOPTHY: CPT | Performed by: NURSE PRACTITIONER

## 2024-01-11 PROCEDURE — 3017F COLORECTAL CA SCREEN DOC REV: CPT | Performed by: NURSE PRACTITIONER

## 2024-01-11 PROCEDURE — G8484 FLU IMMUNIZE NO ADMIN: HCPCS | Performed by: NURSE PRACTITIONER

## 2024-01-11 RX ORDER — METFORMIN HYDROCHLORIDE 500 MG/1
1000 TABLET, EXTENDED RELEASE ORAL 2 TIMES DAILY
Qty: 360 TABLET | Refills: 3 | Status: SHIPPED | OUTPATIENT
Start: 2024-01-11

## 2024-01-11 RX ORDER — GLIPIZIDE 5 MG/1
TABLET, FILM COATED, EXTENDED RELEASE ORAL
Qty: 270 TABLET | Refills: 3 | Status: SHIPPED | OUTPATIENT
Start: 2024-01-11

## 2024-01-11 NOTE — PROGRESS NOTES
SpO2 (!) 80%   BMI 28.38 kg/m²   BP Readings from Last 4 Encounters:   01/11/24 (!) 145/82   01/23/23 131/80   10/25/22 138/85   06/15/22 135/89     Wt Readings from Last 6 Encounters:   01/11/24 87.2 kg (192 lb 3.2 oz)   01/23/23 86.6 kg (191 lb)   10/25/22 84.8 kg (187 lb)   06/15/22 87.5 kg (193 lb)   11/13/20 74.8 kg (164 lb 12.8 oz)   11/02/20 67.6 kg (149 lb)     Physical examination:        Review of Laboratory Data:  I personally reviewed the following lab:  Lab Results   Component Value Date/Time    WBC 8.9 06/15/2022 01:09 PM    RBC 5.02 06/15/2022 01:09 PM    HGB 16.1 06/15/2022 01:09 PM    HCT 46.8 06/15/2022 01:09 PM    MCV 93.2 06/15/2022 01:09 PM    MCH 32.1 06/15/2022 01:09 PM    MCHC 34.4 06/15/2022 01:09 PM    RDW 12.5 06/15/2022 01:09 PM     06/15/2022 01:09 PM    MPV 10.3 06/15/2022 01:09 PM      Lab Results   Component Value Date/Time     06/15/2022 01:09 PM    K 4.4 06/15/2022 01:09 PM    K 4.7 11/02/2020 07:55 PM    CO2 24 06/15/2022 01:09 PM    BUN 10 06/15/2022 01:09 PM    CREATININE 0.6 (L) 06/15/2022 01:09 PM    CALCIUM 9.4 06/15/2022 01:09 PM    LABGLOM >60 06/15/2022 01:09 PM    GFRAA >60 06/15/2022 01:09 PM      Lab Results   Component Value Date/Time    TSH 1.090 11/03/2020 03:31 AM    T4FREE 0.77 (L) 11/03/2020 03:31 AM     Lab Results   Component Value Date/Time    LABA1C 7.1 01/11/2024 02:40 PM    GLUCOSE 133 06/15/2022 01:09 PM    MALBCR 80.7 06/15/2022 01:09 PM    LABCREA 104 06/15/2022 01:09 PM     Lab Results   Component Value Date/Time    LABA1C 7.1 01/11/2024 02:40 PM    LABA1C 6.2 01/23/2023 08:05 AM    LABA1C 7.8 10/25/2022 07:41 AM     Lab Results   Component Value Date/Time    TRIG 152 01/23/2023 08:29 AM    HDL 56 01/23/2023 08:29 AM    LDLCALC 93 01/23/2023 08:29 AM    CHOL 179 01/23/2023 08:29 AM     Lab Results   Component Value Date/Time    VITD25 42 06/15/2022 01:09 PM    VITD25 26 04/23/2021 12:00 AM       Medical Records/Labs/Images review:   I

## 2024-03-07 DIAGNOSIS — E11.9 TYPE 2 DIABETES MELLITUS WITHOUT COMPLICATION, UNSPECIFIED WHETHER LONG TERM INSULIN USE (HCC): ICD-10-CM

## 2024-03-07 RX ORDER — EMPAGLIFLOZIN 10 MG/1
10 TABLET, FILM COATED ORAL DAILY
Qty: 30 TABLET | Refills: 0 | Status: SHIPPED | OUTPATIENT
Start: 2024-03-07

## 2024-03-31 DIAGNOSIS — E11.9 TYPE 2 DIABETES MELLITUS WITHOUT COMPLICATION, UNSPECIFIED WHETHER LONG TERM INSULIN USE (HCC): ICD-10-CM

## 2024-04-01 RX ORDER — EMPAGLIFLOZIN 10 MG/1
10 TABLET, FILM COATED ORAL DAILY
Qty: 30 TABLET | Refills: 5 | Status: SHIPPED | OUTPATIENT
Start: 2024-04-01

## 2024-04-20 DIAGNOSIS — E11.9 TYPE 2 DIABETES MELLITUS WITHOUT COMPLICATION, UNSPECIFIED WHETHER LONG TERM INSULIN USE (HCC): ICD-10-CM

## 2024-04-22 RX ORDER — METFORMIN HYDROCHLORIDE 500 MG/1
1000 TABLET, EXTENDED RELEASE ORAL 2 TIMES DAILY
Qty: 360 TABLET | Refills: 3 | Status: SHIPPED | OUTPATIENT
Start: 2024-04-22

## 2024-05-15 ENCOUNTER — OFFICE VISIT (OUTPATIENT)
Dept: ENDOCRINOLOGY | Age: 49
End: 2024-05-15
Payer: COMMERCIAL

## 2024-05-15 VITALS — HEIGHT: 69 IN | WEIGHT: 193 LBS | BODY MASS INDEX: 28.58 KG/M2

## 2024-05-15 DIAGNOSIS — E11.9 TYPE 2 DIABETES MELLITUS WITHOUT COMPLICATION, UNSPECIFIED WHETHER LONG TERM INSULIN USE (HCC): Primary | ICD-10-CM

## 2024-05-15 DIAGNOSIS — E78.2 MIXED HYPERLIPIDEMIA: ICD-10-CM

## 2024-05-15 DIAGNOSIS — E55.9 VITAMIN D DEFICIENCY: ICD-10-CM

## 2024-05-15 LAB — HBA1C MFR BLD: 7.6 %

## 2024-05-15 PROCEDURE — 83036 HEMOGLOBIN GLYCOSYLATED A1C: CPT | Performed by: NURSE PRACTITIONER

## 2024-05-15 PROCEDURE — 2022F DILAT RTA XM EVC RTNOPTHY: CPT | Performed by: NURSE PRACTITIONER

## 2024-05-15 PROCEDURE — 1036F TOBACCO NON-USER: CPT | Performed by: NURSE PRACTITIONER

## 2024-05-15 PROCEDURE — 3051F HG A1C>EQUAL 7.0%<8.0%: CPT | Performed by: NURSE PRACTITIONER

## 2024-05-15 PROCEDURE — 3017F COLORECTAL CA SCREEN DOC REV: CPT | Performed by: NURSE PRACTITIONER

## 2024-05-15 PROCEDURE — G8419 CALC BMI OUT NRM PARAM NOF/U: HCPCS | Performed by: NURSE PRACTITIONER

## 2024-05-15 PROCEDURE — 99214 OFFICE O/P EST MOD 30 MIN: CPT | Performed by: NURSE PRACTITIONER

## 2024-05-15 PROCEDURE — G8427 DOCREV CUR MEDS BY ELIG CLIN: HCPCS | Performed by: NURSE PRACTITIONER

## 2024-05-15 NOTE — PROGRESS NOTES
[facility]  [facility] Department of Endocrinology Diabetes and Metabolism   [address] [facility], [place] [address]   Phone: [phone]  Fax: [phone]    Date of Service: 5/15/2024    Primary Care Physician: Tish Doyle MD  Referring physician: No ref. provider found  Provider: STACY North NP      Reason for the visit:  DM type 2     History of Present Illness:  The history is provided by the patient. No  was used. Accuracy of the patient data is excellent.    Jun Lopez is a very pleasant 48 y.o. male seen today for diabetes management     Last OV  1/2023    Jun Lopez was diagnosed with diabetes in 10/2020, at jenna time he was admitted to hospital with     The patient is currently on Metformin  mg 2 tab BID and Glipizide Er 5 mg in Am and 10mg at dinner, jardiance 10 mg daily    The patient has been checking blood sugar BID  FBS  150's Pre dinner  < 150    Does not feel well if BS  <120    Most recent A1c results summarized below  Lab Results   Component Value Date/Time    LABA1C 7.6 05/15/2024 07:30 AM    LABA1C 7.1 01/11/2024 02:40 PM    LABA1C 6.2 01/23/2023 08:05 AM     No hypoglycemia   Work schedule interferes with eating habits out of town for work   The patient has not been mindful of what has been eating and following diabetic diet as encouraged  I reviewed current medications and the patient has no issues with diabetes medications  The patient is up to date for eye exam. No h/o diabetic retinopathy  The patient performs his own feet care  Microvascular complications:  No Retinopathy, Nephropathy or Neuropathy   Macrovascular complications: no CAD, PVD, or Stroke    No HX of pancreatitis  No Hx of MTC  No HX of gastroparesis   No HX of UTI/Mycotic infection     PAST MEDICAL HISTORY   Past Medical History:   Diagnosis Date    Psoriasis        PAST SURGICAL HISTORY   No past surgical history on file.    SOCIAL HISTORY

## 2024-09-25 ENCOUNTER — OFFICE VISIT (OUTPATIENT)
Dept: ENDOCRINOLOGY | Age: 49
End: 2024-09-25
Payer: COMMERCIAL

## 2024-09-25 VITALS — HEIGHT: 69 IN | BODY MASS INDEX: 27.99 KG/M2 | WEIGHT: 189 LBS

## 2024-09-25 DIAGNOSIS — E11.9 TYPE 2 DIABETES MELLITUS WITHOUT COMPLICATION, WITHOUT LONG-TERM CURRENT USE OF INSULIN (HCC): Primary | ICD-10-CM

## 2024-09-25 DIAGNOSIS — E55.9 VITAMIN D DEFICIENCY: ICD-10-CM

## 2024-09-25 DIAGNOSIS — E78.2 MIXED HYPERLIPIDEMIA: ICD-10-CM

## 2024-09-25 LAB — HBA1C MFR BLD: 6.8 %

## 2024-09-25 PROCEDURE — 99214 OFFICE O/P EST MOD 30 MIN: CPT | Performed by: NURSE PRACTITIONER

## 2024-09-25 PROCEDURE — G8427 DOCREV CUR MEDS BY ELIG CLIN: HCPCS | Performed by: NURSE PRACTITIONER

## 2024-09-25 PROCEDURE — 1036F TOBACCO NON-USER: CPT | Performed by: NURSE PRACTITIONER

## 2024-09-25 PROCEDURE — 2022F DILAT RTA XM EVC RTNOPTHY: CPT | Performed by: NURSE PRACTITIONER

## 2024-09-25 PROCEDURE — 3051F HG A1C>EQUAL 7.0%<8.0%: CPT | Performed by: NURSE PRACTITIONER

## 2024-09-25 PROCEDURE — G8419 CALC BMI OUT NRM PARAM NOF/U: HCPCS | Performed by: NURSE PRACTITIONER

## 2024-09-25 PROCEDURE — 3017F COLORECTAL CA SCREEN DOC REV: CPT | Performed by: NURSE PRACTITIONER

## 2024-09-25 PROCEDURE — 83036 HEMOGLOBIN GLYCOSYLATED A1C: CPT | Performed by: NURSE PRACTITIONER

## 2025-01-27 ENCOUNTER — OFFICE VISIT (OUTPATIENT)
Dept: ENDOCRINOLOGY | Age: 50
End: 2025-01-27
Payer: COMMERCIAL

## 2025-01-27 ENCOUNTER — TELEPHONE (OUTPATIENT)
Dept: ENDOCRINOLOGY | Age: 50
End: 2025-01-27

## 2025-01-27 VITALS
HEIGHT: 69 IN | SYSTOLIC BLOOD PRESSURE: 122 MMHG | WEIGHT: 193 LBS | DIASTOLIC BLOOD PRESSURE: 80 MMHG | TEMPERATURE: 98.2 F | BODY MASS INDEX: 28.58 KG/M2

## 2025-01-27 DIAGNOSIS — E55.9 VITAMIN D DEFICIENCY: ICD-10-CM

## 2025-01-27 DIAGNOSIS — E11.9 TYPE 2 DIABETES MELLITUS WITHOUT COMPLICATION, UNSPECIFIED WHETHER LONG TERM INSULIN USE (HCC): ICD-10-CM

## 2025-01-27 DIAGNOSIS — E11.9 TYPE 2 DIABETES MELLITUS WITHOUT COMPLICATION, WITHOUT LONG-TERM CURRENT USE OF INSULIN (HCC): Primary | ICD-10-CM

## 2025-01-27 DIAGNOSIS — E78.2 MIXED HYPERLIPIDEMIA: ICD-10-CM

## 2025-01-27 LAB — HBA1C MFR BLD: 7.1 %

## 2025-01-27 PROCEDURE — 83036 HEMOGLOBIN GLYCOSYLATED A1C: CPT | Performed by: NURSE PRACTITIONER

## 2025-01-27 PROCEDURE — 1036F TOBACCO NON-USER: CPT | Performed by: NURSE PRACTITIONER

## 2025-01-27 PROCEDURE — 3017F COLORECTAL CA SCREEN DOC REV: CPT | Performed by: NURSE PRACTITIONER

## 2025-01-27 PROCEDURE — 99214 OFFICE O/P EST MOD 30 MIN: CPT | Performed by: NURSE PRACTITIONER

## 2025-01-27 PROCEDURE — G8419 CALC BMI OUT NRM PARAM NOF/U: HCPCS | Performed by: NURSE PRACTITIONER

## 2025-01-27 PROCEDURE — 3051F HG A1C>EQUAL 7.0%<8.0%: CPT | Performed by: NURSE PRACTITIONER

## 2025-01-27 PROCEDURE — G8427 DOCREV CUR MEDS BY ELIG CLIN: HCPCS | Performed by: NURSE PRACTITIONER

## 2025-01-27 PROCEDURE — 2022F DILAT RTA XM EVC RTNOPTHY: CPT | Performed by: NURSE PRACTITIONER

## 2025-01-27 RX ORDER — METFORMIN HYDROCHLORIDE 500 MG/1
1000 TABLET, EXTENDED RELEASE ORAL 2 TIMES DAILY
Qty: 360 TABLET | Refills: 3 | Status: SHIPPED | OUTPATIENT
Start: 2025-01-27

## 2025-01-27 RX ORDER — GLIPIZIDE 5 MG/1
TABLET, FILM COATED, EXTENDED RELEASE ORAL
Qty: 270 TABLET | Refills: 3 | Status: SHIPPED | OUTPATIENT
Start: 2025-01-27

## 2025-01-27 NOTE — PROGRESS NOTES
(5' 9\")   Wt 87.5 kg (193 lb)   BMI 28.50 kg/m²   BP Readings from Last 4 Encounters:   01/27/25 122/80   01/11/24 (!) 145/82   01/23/23 131/80   10/25/22 138/85     Wt Readings from Last 6 Encounters:   01/27/25 87.5 kg (193 lb)   09/25/24 85.7 kg (189 lb)   05/15/24 87.5 kg (193 lb)   01/11/24 87.2 kg (192 lb 3.2 oz)   01/23/23 86.6 kg (191 lb)   10/25/22 84.8 kg (187 lb)     Physical examination:        Review of Laboratory Data:  I personally reviewed the following lab:  Lab Results   Component Value Date/Time    WBC 8.9 06/15/2022 01:09 PM    RBC 5.02 06/15/2022 01:09 PM    HGB 16.1 06/15/2022 01:09 PM    HCT 46.8 06/15/2022 01:09 PM    MCV 93.2 06/15/2022 01:09 PM    MCH 32.1 06/15/2022 01:09 PM    MCHC 34.4 06/15/2022 01:09 PM    RDW 12.5 06/15/2022 01:09 PM     06/15/2022 01:09 PM    MPV 10.3 06/15/2022 01:09 PM      Lab Results   Component Value Date/Time     06/15/2022 01:09 PM    K 4.4 06/15/2022 01:09 PM    K 4.7 11/02/2020 07:55 PM    CO2 24 06/15/2022 01:09 PM    BUN 10 06/15/2022 01:09 PM    CREATININE 0.6 (L) 06/15/2022 01:09 PM    CALCIUM 9.4 06/15/2022 01:09 PM    LABGLOM >60 06/15/2022 01:09 PM    GFRAA >60 06/15/2022 01:09 PM      Lab Results   Component Value Date/Time    TSH 1.090 11/03/2020 03:31 AM    T4FREE 0.77 (L) 11/03/2020 03:31 AM     Lab Results   Component Value Date/Time    LABA1C 7.1 01/27/2025 07:40 AM    GLUCOSE 133 06/15/2022 01:09 PM     Lab Results   Component Value Date/Time    LABA1C 7.1 01/27/2025 07:40 AM    LABA1C 6.8 09/25/2024 07:51 AM    LABA1C 7.6 05/15/2024 07:30 AM     Lab Results   Component Value Date/Time    TRIG 152 01/23/2023 08:29 AM    HDL 56 01/23/2023 08:29 AM    CHOL 179 01/23/2023 08:29 AM     Lab Results   Component Value Date/Time    VITD25 42 06/15/2022 01:09 PM    VITD25 26 04/23/2021 12:00 AM       ASSESSMENT & RECOMMENDATIONS   Jun Lopez, a 49 y.o.-old male seen in for the following issues     Diabetes Mellitus Type 2   Patient DM

## 2025-01-28 ENCOUNTER — TELEPHONE (OUTPATIENT)
Dept: ENDOCRINOLOGY | Age: 50
End: 2025-01-28

## 2025-01-28 NOTE — TELEPHONE ENCOUNTER
Labs reviewed. Vit D is low I would advise otc Vit D 2000 IU daily. TRG slightly above goal of 150, he is 194 and his LDL  109 ( goal <100) I advise exercise, low saturated fat diet

## 2025-04-02 DIAGNOSIS — E11.9 TYPE 2 DIABETES MELLITUS WITHOUT COMPLICATION, UNSPECIFIED WHETHER LONG TERM INSULIN USE: ICD-10-CM

## 2025-04-03 RX ORDER — METFORMIN HYDROCHLORIDE 500 MG/1
1000 TABLET, EXTENDED RELEASE ORAL 2 TIMES DAILY
Qty: 360 TABLET | Refills: 3 | Status: SHIPPED | OUTPATIENT
Start: 2025-04-03

## 2025-04-03 RX ORDER — GLIPIZIDE 5 MG/1
TABLET, FILM COATED, EXTENDED RELEASE ORAL
Qty: 270 TABLET | Refills: 3 | Status: SHIPPED | OUTPATIENT
Start: 2025-04-03

## 2025-04-03 NOTE — TELEPHONE ENCOUNTER
1/27/25: Continue DM regimen to Metformin  mg 2 tab BID, Glipizide ER 5 mg in AM, 10 mg at dinner

## 2025-05-28 ENCOUNTER — OFFICE VISIT (OUTPATIENT)
Dept: ENDOCRINOLOGY | Age: 50
End: 2025-05-28
Payer: COMMERCIAL

## 2025-05-28 VITALS
TEMPERATURE: 97.6 F | HEIGHT: 69 IN | OXYGEN SATURATION: 99 % | RESPIRATION RATE: 18 BRPM | BODY MASS INDEX: 28.44 KG/M2 | HEART RATE: 84 BPM | DIASTOLIC BLOOD PRESSURE: 75 MMHG | SYSTOLIC BLOOD PRESSURE: 128 MMHG | WEIGHT: 192 LBS

## 2025-05-28 DIAGNOSIS — E78.2 MIXED HYPERLIPIDEMIA: ICD-10-CM

## 2025-05-28 DIAGNOSIS — E55.9 VITAMIN D DEFICIENCY: ICD-10-CM

## 2025-05-28 DIAGNOSIS — E11.9 TYPE 2 DIABETES MELLITUS WITHOUT COMPLICATION, UNSPECIFIED WHETHER LONG TERM INSULIN USE (HCC): Primary | ICD-10-CM

## 2025-05-28 LAB — HBA1C MFR BLD: 7 %

## 2025-05-28 PROCEDURE — 3017F COLORECTAL CA SCREEN DOC REV: CPT | Performed by: NURSE PRACTITIONER

## 2025-05-28 PROCEDURE — 99214 OFFICE O/P EST MOD 30 MIN: CPT | Performed by: NURSE PRACTITIONER

## 2025-05-28 PROCEDURE — G8427 DOCREV CUR MEDS BY ELIG CLIN: HCPCS | Performed by: NURSE PRACTITIONER

## 2025-05-28 PROCEDURE — 2022F DILAT RTA XM EVC RTNOPTHY: CPT | Performed by: NURSE PRACTITIONER

## 2025-05-28 PROCEDURE — G8419 CALC BMI OUT NRM PARAM NOF/U: HCPCS | Performed by: NURSE PRACTITIONER

## 2025-05-28 PROCEDURE — 1036F TOBACCO NON-USER: CPT | Performed by: NURSE PRACTITIONER

## 2025-05-28 PROCEDURE — 83036 HEMOGLOBIN GLYCOSYLATED A1C: CPT | Performed by: NURSE PRACTITIONER

## 2025-05-28 PROCEDURE — 3051F HG A1C>EQUAL 7.0%<8.0%: CPT | Performed by: NURSE PRACTITIONER

## 2025-05-28 NOTE — PROGRESS NOTES
[facility] PHYSICIANS BTIG  [facility] Department of Endocrinology Diabetes and Metabolism   [address], [place] [address]   Phone: [phone]  Fax: [phone]    Date of Service: 5/28/2025    Primary Care Physician: No primary care provider on file.  Referring physician: No ref. provider found  Provider: STACY North NP      Reason for the visit:  DM type 2     History of Present Illness:  The history is provided by the patient. No  was used. Accuracy of the patient data is excellent.    Jun Lopez is a very pleasant 50 y.o. male seen today for diabetes management     Last OV  1/2023    Jun Lopez was diagnosed with diabetes in 10/2020, at jenna time he was admitted to hospital with     The patient is currently on Metformin  mg 2 tab BID and Glipizide Er 5 mg in Am and 10mg at dinner, jardiance 25  mg daily    The patient has been checking blood sugar BID  's  AT HS  <150    Does not feel well if BS  <110    Most recent A1c results summarized below  Lab Results   Component Value Date/Time    LABA1C 7.0 05/28/2025 07:39 AM    LABA1C 7.1 01/27/2025 07:40 AM    LABA1C 6.8 09/25/2024 07:51 AM     No hypoglycemia   The patient has not been mindful of what has been eating and following diabetic diet as encouraged  I reviewed current medications and the patient has no issues with diabetes medications  The patient is up to date for eye exam. No h/o diabetic retinopathy  Stopped all pop  The patient performs his own feet care  Microvascular complications:  No Retinopathy, Nephropathy or Neuropathy   Macrovascular complications: no CAD, PVD, or Stroke    No HX of pancreatitis  No Hx of MTC  No HX of gastroparesis   No HX of UTI/Mycotic infection     PAST MEDICAL HISTORY   Past Medical History:   Diagnosis Date    Psoriasis        PAST SURGICAL HISTORY   No past surgical history on file.    SOCIAL HISTORY   Tobacco:   reports that he has never